# Patient Record
Sex: MALE | Race: WHITE
[De-identification: names, ages, dates, MRNs, and addresses within clinical notes are randomized per-mention and may not be internally consistent; named-entity substitution may affect disease eponyms.]

---

## 2019-01-08 ENCOUNTER — HOSPITAL ENCOUNTER (OUTPATIENT)
Dept: HOSPITAL 84 - D.CATH | Age: 59
Discharge: HOME | End: 2019-01-08
Attending: INTERNAL MEDICINE
Payer: COMMERCIAL

## 2019-01-08 VITALS
HEIGHT: 65 IN | BODY MASS INDEX: 33.89 KG/M2 | BODY MASS INDEX: 33.89 KG/M2 | WEIGHT: 203.43 LBS | WEIGHT: 203.43 LBS | HEIGHT: 65 IN

## 2019-01-08 VITALS — SYSTOLIC BLOOD PRESSURE: 135 MMHG | DIASTOLIC BLOOD PRESSURE: 82 MMHG

## 2019-01-08 DIAGNOSIS — I35.0: ICD-10-CM

## 2019-01-08 DIAGNOSIS — R94.30: ICD-10-CM

## 2019-01-08 DIAGNOSIS — I25.110: Primary | ICD-10-CM

## 2019-01-08 LAB
ANION GAP SERPL CALC-SCNC: 14.9 MMOL/L (ref 8–16)
BASOPHILS NFR BLD AUTO: 0.5 % (ref 0–2)
BUN SERPL-MCNC: 17 MG/DL (ref 7–18)
CALCIUM SERPL-MCNC: 8.9 MG/DL (ref 8.5–10.1)
CHLORIDE SERPL-SCNC: 103 MMOL/L (ref 98–107)
CO2 SERPL-SCNC: 25 MMOL/L (ref 21–32)
CREAT SERPL-MCNC: 1 MG/DL (ref 0.6–1.3)
EOSINOPHIL NFR BLD: 12.7 % (ref 0–7)
ERYTHROCYTE [DISTWIDTH] IN BLOOD BY AUTOMATED COUNT: 12.8 % (ref 11.5–14.5)
GLUCOSE SERPL-MCNC: 114 MG/DL (ref 74–106)
HCT VFR BLD CALC: 44.5 % (ref 42–54)
HGB BLD-MCNC: 15.2 G/DL (ref 13.5–17.5)
IMM GRANULOCYTES NFR BLD: 0.2 % (ref 0–5)
LYMPHOCYTES NFR BLD AUTO: 40.4 % (ref 15–50)
MCH RBC QN AUTO: 30.3 PG (ref 26–34)
MCHC RBC AUTO-ENTMCNC: 34.2 G/DL (ref 31–37)
MCV RBC: 88.8 FL (ref 80–100)
MONOCYTES NFR BLD: 11.5 % (ref 2–11)
NEUTROPHILS NFR BLD AUTO: 34.7 % (ref 40–80)
OSMOLALITY SERPL CALC.SUM OF ELEC: 280 MOSM/KG (ref 275–300)
PLATELET # BLD: 251 10X3/UL (ref 130–400)
PMV BLD AUTO: 10.5 FL (ref 7.4–10.4)
POTASSIUM SERPL-SCNC: 3.9 MMOL/L (ref 3.5–5.1)
RBC # BLD AUTO: 5.01 10X6/UL (ref 4.2–6.1)
SODIUM SERPL-SCNC: 139 MMOL/L (ref 136–145)
WBC # BLD AUTO: 8.3 10X3/UL (ref 4.8–10.8)

## 2019-01-08 NOTE — NUR
REPOSITIONED TO SITTING WITH HOB UP 30 FOR COMFORT.4 CC AIR REMOVED
FROM TR BAND WITH NO BLEEDING NOTED. 5 FR EXOSEAL R/GROIN REMAINS CDI

## 2019-01-08 NOTE — NUR
4 CC AIR REMOVED FROM TR BAND WITH NO BLEEDING. PIV REMOVED WITH
DRESSING APPLIED. PATIENT DENIED CHEST PAIN AS GETTING UP TO GET
DRESSED FOR DISCHARGE HOME

## 2019-01-08 NOTE — HEMODYNAMI
PATIENT:BRENNON KELLY                                     MEDICAL RECORD: [id number]
: 58                                            LOCATION:D.CAT          
ACCT# [id number]                                       ADMISSION DATE: 19
 
 
 Generatedon:20198:33
Patient name: BRENNON KELLY Patient #: [id number] Visit #: [id number] SSN: :
 1960 Date of
study: 2019
Page: Of
Hemodynamic Procedure Report
****************************
Patient Data
Patient Demographics
Procedure consent was obtained
First Name: BRENNON            Gender: Male
Last Name: LETICIA          : 1960
Patient #: [id number]       Age: 58 year(s)
Visit #: [id number]       Race: Unknown
Accession #:
[id number]
Additional ID: [id number]
Contact details
Address: [address] [place] [address]      Phone: [phone]
[facility]
State: [place]
City: [place]
Zip code: [address]
Past Medical History
Allergies: No known allergies
Admission
Admission Data
Admission Date: 2019    Admission Time: 5:59
Procedure
Procedure Types
Cath Procedure
Diagnostic Procedure
LHC
Coronaries only
Sedation Charges
Moderate Sedation up to 15 minutes
Procedure Description
Procedure Date
Procedure Date: 2019
Procedure Start Time: 8:12
Procedure End Time: 8:33
Procedure Staff
Name                            Function
Macario Huff MD              Performing Physician
Deisi Toribio RT             Monitor
Genie Escalante RN                Nurse
Leighann Petty RN                  Circulator
Jeanne Brandt RT               Scrub
Procedure Data
Cath Procedure
Fluoroscopy
Diagnostic fluoroscopy      Total fluoroscopy Time: 6.9
time: 6.9 min               min
Diagnostic fluoroscopy      Total fluoroscopy dose: 691
 
dose: 691 mGy               mGy
Contrast Material
Contrast Material Type                       Amount (ml)
Isovue 300                                   43
Entry Location
Entry     Primary  Successful  Side  Size  Upsize Upsize Entry    Closure     Canales
ccessful  Closure
Location                             (Fr)  1 (Fr) 2 (Fr) Remarks  Device        
          Remarks
Radial                         Right 6 Fr                         Mechanical
artery                               Short                        Compression
Femoral                        Right 5 Fr                         Exoseal
artery
Estimated blood loss: 5 ml
Diagnostic catheters
Device Type               Used For           End Catheter
Placement
DIAGNOSTIC [place] 110cm 5  LV Angiography
Fr catheter (430758)
MULTIPACK JL 4.0 5Fr      Left Coronary
catheter                  Angiography
MULTIPACK 3DRC 5Fr        Right Coronary
catheter                  Angiography
MULTIPACK Pigtail 5 Fr    LV Angiography
catheter
DIAGNOSTIC AL1 5Fr        LV Angiography
catheter ([id number])
Procedure Complications
No complications
Procedure Medications
Medication           Administration Route Dosage
0.9% NaCl            I.V.                 100 ml/hr
Oxygen               etCO2 Nasal cannula  2 l/min
Lidocaine 2%         added to field       20
Heparin Flush Bag    added to field       2 bags
(1000units/500ml NS)
Radial Cocktail      added to field       1 syringe
(Verapomil 2mg/Nitro
400mcg/Heparin
1500units)
Versed               I.V.                 2 mg
Fentanyl             I.V.                 50 mcg
Versed               I.V.                 1 mg
Fentanyl             I.V.                 25 mcg
Versed               I.V.                 1 mg
Fentanyl             I.V.                 25 mcg
Hemodynamics
Rest
Heart Rate: 75 (bpm)
Snapshots
Pre Cath      Intra         NCS           Post Cath
Vital Signs
Time    Heart  Resp   SPO2 etCO2   NIBP (mmHg) Rhythm  Pain    Sedation
Rate   (ipm)  (%)  (mmHg)                      Status  Level
(bpm)
7:43:02 82     16     98   21.8    144/90(127) NSR     0 (11)  10(A)
, No
pain
7:47:25 80     17     98   36.8    125/80(99)  NSR     0 (11)  10(A)
, No
 
pain
7:52:34 72     17     97   37.6    101/81(92)  NSR     0 (11)  10(A)
, No
pain
7:56:42 78     15     97   39.1    105/71(89)  NSR     0 (11)  10(A)
, No
pain
8:00:50 79     15     98   42.1    109/74(85)  NSR     0 (11)  10(A)
, No
pain
8:06:01 70     15     98   34.6    102/65(85)  NSR     0 (11)  10(A)
, No
pain
8:10:13 72     15     96   28.6    104/61(82)  NSR     0 (11)  9(A)
, No
pain
8:14:23 76     16     97   35.3    105/66(83)  NSR     0 (11)  9(A)
, No
pain
8:18:31 80     15     96   25.6    98/63(83)   NSR     0 (11)  10(A)
, No
pain
8:22:41 76     12     97   24.1    109/65(91)  NSR     0 (11)  9(A)
, No
pain
8:26:48 79     12     98   25.8    108/76(92)  NSR     0 (11)  9(A)
, No
pain
8:30:54 73     11     96   37.6    104/70(90)  NSR     0 (11)  10(A)
, No
pain
Medications
Time    Medication       Route   Dose    Verified Delivered Reason     Notes  Ef
fectiveness
by       by
7:40:26 0.9% NaCl        I.V.    100     Macario  Genie     used for
ml/hr   Daria  Boris   procedure
MD STROUD
7:40:33 Oxygen           etCO2   2 l/min Macario  Genie     used for
Nasal           [facility]   procedure
cannula         MD       RN
7:40:39 Lidocaine 2%     added   20ml    Macario Sorto   for local
to      vial    [facility]   anesthetic
field MD AMOS
7:40:47 Heparin Flush    added   2 bags  Macario Sorto   used for
Bag              to              [facility]   procedure
(1000units/500ml field           MD       MD
NS)
7:44:30 Radial Cocktail  added   1       Macario  Macario   used for
(Verapomil       to      syringe [facility]   procedure
2mg/Nitro        field           MD       MD
400mcg/Heparin
1500units)
8:03:59 Versed           I.V.    2 mg    Macario  Genie     for
DariaNakul Escalante   sedation
MD STROUD
8:04:06 Fentanyl         I.V.    50 mcg  Macario  Genie     for
St Nakul Escalante   sedation
MD STROUD
8:08:46 Versed           I.V.    1 mg    Macario  Genie     for
 
St Nakul Escalante   sedation
MD STROUD
8:08:49 Fentanyl         I.V.    25 mcg  Macario  Genie     for
St Nakul Escalante   sedation
MD STROUD
8:17:47 Versed           I.V.    1 mg    Macario  Genie     for
St Nakul Escalante   sedation
MD STROUD
8:18:26 Fentanyl         I.V.    25 mcg  Macario  Genie     for
St Nakul Escalante   sedation
MD STROUD
Procedure Log
Time     Note
7:27:18  Time tracking: Regular hours (M-F 7:00 - 5:00)
7:27:22  Plan of Care:Hemodynamics will remain stable., Cardiac
rhythm will remain stable., Comfort level will be
maintained., Respiratory function will remain
adequate., Patient/ family verbilizes understanding of
procedure., Procedure tolerated without complication.,
Recovers from procedure without complications..
7:27:25  Leighann Petty RN sent for patient. Start room use.
7:36:31  Patient received from Pre/Post Procedure Room to CCL 1
Alert and oriented. Tansferred to table in Supine
position.
7:36:32  Warm blankets applied, and lynn hugger turned on for
patient comfort.
7:36:33  Correct patient and procedure confirmed by team.
7:36:34  Signed procedure consent form obtained from patient.
7:36:37  ECG and BP/O2 sat monitors applied to patient.
7:36:37  Full Disclosure recording started
7:40:26  0.9% NaCl 100 ml/hr I.V. was administered by Genie Escalante RN; used for procedure;
7:40:33  Oxygen 2 l/min etCO2 Nasal cannula was administered by
Genie Escalante RN; used for procedure;
7:40:39  Lidocaine 2% 20ml vial added to field was administered
by Macario Huff MD; for local anesthetic;
7:40:47  Heparin Flush Bag (1000units/500ml NS) 2 bags added to
field was administered by Macario Huff MD; used for
procedure;
7:41:46  Vital chart was started
7:41:50  Rhythm: sinus rhythm
7:42:02  H&P Date Dictated: 2019 Within 30 days and on
chart., H&P Addendum completed by physician on day of
procedure. (MUST COMPLETE FOR ALL OUTPATIENTS).
7:44:12  Pre-procedure instructions explained to patient.
7:44:13  Pre-op teaching completed and patient verbalized
understanding.
7:44:18  Family in patients room.
7:44:19  Patient NPO since Midnight.
7:44:26  Patient allergic to No known allergies
7:44:28  Is the patient allergic to Iodine/contrast media? No.
7:44:30  Radial Cocktail (Verapomil 2mg/Nitro 400mcg/Heparin
1500units) 1 syringe added to field was administered
by Macario Huff MD; used for procedure;
7:44:30  Is patient on blood thinner?No
7:44:31  Patient diabetic? No.
7:44:35  Previous problem with sedation/anesthesia? No ?
7:44:36  Snore? Yes
7:44:37  Sleep apnea? No
7:44:39  Deviated septum? No
 
7:44:40  Opens mouth fully? Yes
7:44:41  Sticks out tongue? Yes
7:44:44  Airway obstruction? No ?
7:44:55  Dentures? No LOST TEETH
7:45:04  Pre procedure: right dorsailis pedis pulse 2+ Normal;
easily identifiable; not easily obliterated
7:45:07  Modified Roman's test Ulnar < 7 seconds
7:45:09  Patient pain scale 0/10 ?.
7:45:16  IV patent on arrival in left forearm with 0.9% NaCl at
[facility].
7:45:19  Lab results completed and on chart.
7:45:28  Right Radial & Right Groin area was prepped with
chlora-prep and draped in sterile fashion
7:45:34  Alarms reviewed by R. N.
7:45:35  Sharps counted by scrub and verified by R.N.
7:45:38  Use device set Radial Dx or PCI
7:45:39  ACIST Syringe (53263) opened to sterile field.
7:45:40  Medline Cath Pack ([id number]) opened to sterile field.
7:45:40  Bag Decanter (2002S) opened to sterile field.
7:45:41  DIAGNOSTIC WIRE .035 260cm J wire (014934) opened to
sterile field.
7:45:41  ACIST Hand Control (50040) opened to sterile field.
7:45:42  ACIST Manifold (64198) opened to sterile field.
7:45:46  MBrace Wrist Support ([id number]) opened to sterile
field.
7:45:47  SHEATH 6FR Slender () opened to sterile field.
7:48:39  Baseline sample Acquired.
7:52:24  Physician paged
7:53:25  Zero performed for pressure channel P1
8:03:26  Final Timeout: patient, procedure, and site verified
with staff and physician. All members of the team are
in agreement.
8:03:29  Right Radial site verified by team.
8:03:33  Physical assessment completed. ASA score P 2 - A
patient with mild systemic disease as per Macario Huff MD.
8:03:36  Sedation plan: IV Moderate Sedation Medication:Versed,
Fentanyl
8:03:59  Versed 2 mg I.V. was administered by Genie Escalante RN;
for sedation;
8:04:06  Fentanyl 50 mcg I.V. was administered by Genie Escalante
RN; for sedation;
8:08:46  Versed 1 mg I.V. was administered by Genie Escalante RN;
for sedation;
8:08:49  Fentanyl 25 mcg I.V. was administered by Genie Escalante RN; for sedation;
8:12:19  Procedure started.
8:12:28  Local anesthetic to right radial artery with Lidocaine
2% by Macario Huff MD.**INITIAL ACCESS ONLY**
8:13:26  A 6 Fr Short sheath was inserted into the Right Radial
artery
8:13:39  A DIAGNOSTIC [place] 110cm 5 Fr catheter ([id number]) was
advanced over the wire and used for LV Angiography.
REMOVED UNABLE TO ADVANCE
8:15:28  GLIDE WIRE Super Stiff Angled 260cm ([id number]) opened to
sterile field.
8:16:02  TORQUE DEVICE PLASTIC .038 ( TD01) opened to sterile
field.
8:16:24  SUPER STIFF wire advanced.
8:17:29  Wire removed.
 
8:17:34  Local anesthetic to right femoral artery with
Lidocaine 2% by Macario Huff MD.**ADDITIONAL
ACCESS**
8:17:47  Versed 1 mg I.V. was administered by Genie Escalante RN;
for sedation;
8:18:10  Use device set Multipack Set
8:18:14  DIAGNOSTIC Multipack 5Fr catheter set ([id number]) opened
to sterile field.
8:18:26  Fentanyl 25 mcg I.V. was administered by Genie Escalante
RN; for sedation;
8:18:31  A 5 Fr sheath was inserted into the Right Femoral
artery
8:19:04  A MULTIPACK JL 4.0 5Fr catheter was advanced over the
wire and used for Left Coronary Angiography.
8:20:33  SHEATH 5FR [place] (NDC157) opened to sterile field.
8:21:07  Catheter removed.
8:21:28  A MULTIPACK 3DRC 5Fr catheter was advanced over the
wire and used for Right Coronary Angiography.
8:21:57  Catheter removed.
8:23:38  A MULTIPACK Pigtail 5 Fr catheter was advanced over
the wire and used for LV Angiography. REMOVED UNABLE
TO CROSS VALVE
8:25:27  A DIAGNOSTIC AL1 5Fr catheter ([id number]) was advanced
over the wire and used for LV Angiography.
8:26:04  ROADRUNNER .035 260 glide wire ([id number]) opened to
sterile field.
8:26:56  ROADRUNNER wire advanced.
8::57  Timer 1 started at 8:26 AM, stopped at 8:28 AM,
duration 00:02:36 sec.
8:29:00  Catheter removed.
8:29:05  Wire removed.
8:29:19  Sheath removed intact; hemostasis achieved with
Exoseal to the Right Femoral artery.
8::32  Sheath removed intact; hemostasis achieved with
Mechanical Compression to the Right Radial artery.
8:29:34  Procedure ended.(Physican Out)
8:29:44  Fluoroscopy time 06.90 minutes.
8:29:50  Fluoroscopy dose: 691 mGy
8:29:50  Flurop Dose total: 691
8:30:07  Contrast amount:Isovue 300 43ml.
8:30:09  Sharps counted by scrub and verified by R.N.
8:30:11  TR band inflated with 12cc of air.
8:30:16  Insertion/operative site no bleeding no hematoma.
8:30:19  Post-op/insertion site Right Femoral artery dressed
using a 4 x 4 and Tegaderm.
8:30:26  Post right femoral artery:stable, clean and dry
8:30:58  Post right radial artery:stable, clean and dry
8:31:00  Post Procedure Pulses reassessed and unchanged
8:31:03  Post-procedure physical assessment completed. ASA
score P 2 - A patient with mild systemic disease as
per Macario Huff MD.
8:31:06  Post procedure rhythm: unchanged.
8:31:08  Estimated blood loss: 5 ml
8:31:09  Post procedure instruction explained to
patient.Patient verbalizes understanding.
8:31:09  Patient needs reinforcement of post procedure
teaching.
8:31:18  Procedure type changed to Cath procedure, Diagnostic
procedure, LHC, Coronaries only, Sedation Charges,
Moderate Sedation up to 15 minutes
 
8::34  Procedure Complication : No complications
8:31:36  See physician's report for complete and final results.
8:32:08  TR BAND Standard ([id number]) opened to sterile field.
8:32:18  EXOSEAL 5Fr () opened to sterile field.
8:32:19  Tegaderm 4 x 4 (1626W) opened to sterile field.
8:32:38  Procedure and supply charges have been captured,
reviewed, submitted and are correct.
8:32:47  Vital chart was stopped
8:32:49  Report given to Pre/Post Procedure Room.
8:32:51  Patient transfered to Pre/Post Procedure Room with
Stretcher.
8:33:00  Procedure ended.
8:33:00  Full Disclosure recording stopped
8:33:03  End room use (Document Last)
Device Usage
Item Name   Manufacture  Quantity  Catalog      Hospital Part    Current Minimal
 Lot# /
Number       Charge   Number  Stock   Stock   Serial#
Code
ACIST       Acist        1         48337        888531   827699  059795  20
Syringe     Medical
(20169)     Systems Inc
Medline     Medline      1         [id number]    951145   78573   847638  5
Cath Pack
([id number])
Bag         Microtek     1         S        138129   07598   855503  5
Decanter    Medical Inc.
()
DIAGNOSTIC  [place]      1         615694       989788   252870  787328  30
WIRE .035
260cm J
wire
(326935)
ACIST Hand  Acist        1         37403        423369   781608  333998  5
Control     Medical
(20545)     Systems Inc
ACIST       Acist        1         79426        203839   662474  927506  5
Manifold    Medical
(59763)     Systems Inc
MBrace      Advanced     1         140-0250-00  708145   63736   009911  5
Wrist       Vascular
Support     Dynamics
([id number])
SHEATH 6FR  Terumo       1         [id number]   932889   116184  380281  5
Slender
()
DIAGNOSTIC  Terumo       1               279096   980588  305942  5
[place]iger 110cm
5 Fr
catheter
(359962)
GLIDE WIRE  Terumo       1         [id number]       754349   080187  716258  5
Super Stiff
Angled
260cm
(AE5705)
TORQUE      Ferdinand       1         TD01         911037   207124  416470  5
DEVICE      Scientific
PLASTIC
.038 (
 
TD01)
DIAGNOSTIC  Cardinal     1         RB7584       141449   90371   719943  30
Multipack   Health
5Fr
catheter
set
(WL5802)
MULTIPACK   Cardinal     1                                       121196  5
JL 4.0 5Fr  Health
catheter
SHEATH 5FR  Terumo       1         [id number]       083092   933402  907779  5
[place]
(LSM734)
MULTIPACK   Cardinal     1                                       282576  5
3DRC 5Fr    Health
catheter
MULTIPACK   Cardinal     1                                       779202  5
Pigtail 5   Health
Fr catheter
DIAGNOSTIC  Cardinal     1         [id number]      055515   379642  179702  15
AL1 5Fr     Health
catheter
(569477T)
[facility] 1         [id number]       186201   136186  981442  5
.035 260
glide wire
(C59407)
TR BAND     Terumo       1         SSL29-RCU    047340   221945  840417  40
Standard
([id number])
EXOSEAL 5Fr Cardinal     1                 606861   818520  991429  10
()     Health
Tegaderm 4  3M           1         1626W        243576   633934  235165  5
x 4 (1626W)
Signature Audit [employer]
Stage           Time        Signature      Unsigned
Intra-Procedure 2019    Deisi
8:33:36 AM  Counts RT(R)
Signatures
Monitor : Deisi     Signature :
Counts RT               ______________________________
Date : ______________ Time :
______________
 
 
 
 
 
 
 
 
 
 
 
 
[address] [place] [address]

## 2019-01-08 NOTE — NUR
PATIENT TOLERATING SIPS OF WATER WITH NAUSEA DENIED. TR BAND IS CDI
AND 5 FR EXOSEAL TO R/GROIN REMAINS CDI NO BLEEDING NOTED CALL LIGHT
IN REACH WITH FAMILY AT SIDE

## 2019-01-08 NOTE — NUR
DR CRAIG PRESENT AT BEDSIDE TALKING TO PATIENT AND FAMILY. VSS WITH
NO DISTRESS. TR BAND IS TO R/WRIST CDI AND 5 FR EXOSEAL R/GROIN IS
CDI

## 2019-01-08 NOTE — NUR
VERBAL AND WRITTEN DISCHARGE GONE OVER WITH PATIENT AND FAMILY. TR
BAND REMOVED WITH DRESSING APPLIED. PATIENT LEFT VIA WC TO PARKING
FOR TRANSPORT HOME NO DISTRESS

## 2019-01-08 NOTE — NUR
RECIEVED TO ROOM VIA STRETCHER FROM CATH LAB WITH TR BAND TO R/WRIST
CDI AND 5 FR EXOSEAL R/GROIN CDI NO BLEEDING OR HEMATOMA NOTED.
PATIENT CONNECTED TO MONITOR FOR OBSERVATION WITH HR 72 /62
CHEST PAIN IS DENIED. INSTRUCTED PATIENT TO KEEP HEAD FLAT ON PILLOW
WITH RLE STRAIGHT

## 2019-01-10 NOTE — OP
PATIENT NAME:  BRENNON KELLY                              MEDICAL RECORD: [id number]
:58                                             LOCATION:D.CAT          
                                                         ADMISSION DATE:        
SURGEON:  VIOLA CRAIG MD          
 
 
DATE OF OPERATION:  2019
 
PROCEDURES:  Left heart catheterization, selective coronary angiography, right
femoral artery approach.
 
CATHETERS:  A 5-Vietnamese sheath, 5/4 left and right Melissa.  
 
Attempt to cross the aortic valve with a pigtail as well as [place], unable to cross
the aortic valve.  
Previous gradient of 44 mmHg.  LV function normal on echocardiographic study.
 
CORONARY ANATOMY:
LEFT MAIN:  Left main is free of disease.
LAD:  Has an 80% stenosis before takeoff of a large diagonal, both appear to be
reasonable targets.
CIRCUMFLEX:  Left dominant system with a proximal stenosis of 80%.
RIGHT CORONARY ARTERY:  Rudimentary, but does have significant proximal
stenosis.
 
IMPRESSION:  Multivessel disease, at least moderate AS.  Probably best, given
young age, etc, served by bypass graft as well as AVR.
 
TRANSINT:[id number] Voice Confirmation ID: [id number] DOCUMENT ID: [id number]
                                           
                                           VIOLA CRAIG MD          
 
 
 
Electronically Signed by VIOLA CRAIG on 01/10/19 at 1330
 
 
 
 
 
 
 
 
 
 
 
 
 
 
 
CC:                                                             [id number]
DICTATION DATE: 19 0836     :     19 1112      DEP CLI 
                                                                      19
[facility]                                          
[address] [place] [address]

## 2019-01-14 LAB
ALBUMIN SERPL-MCNC: 3.8 G/DL (ref 3.4–5)
ALP SERPL-CCNC: 57 U/L (ref 46–116)
ALT SERPL-CCNC: 50 U/L (ref 10–68)
ANION GAP SERPL CALC-SCNC: 13.5 MMOL/L (ref 8–16)
APPEARANCE UR: CLEAR
APTT BLD: 26.5 SECONDS (ref 22.8–39.4)
BASOPHILS NFR BLD AUTO: 0.5 % (ref 0–2)
BILIRUB SERPL-MCNC: 0.4 MG/DL (ref 0.2–1.3)
BILIRUB SERPL-MCNC: NEGATIVE MG/DL
BUN SERPL-MCNC: 15 MG/DL (ref 7–18)
CALCIUM SERPL-MCNC: 9 MG/DL (ref 8.5–10.1)
CHLORIDE SERPL-SCNC: 100 MMOL/L (ref 98–107)
CO2 SERPL-SCNC: 26.9 MMOL/L (ref 21–32)
COLOR UR: YELLOW
CREAT SERPL-MCNC: 1 MG/DL (ref 0.6–1.3)
EOSINOPHIL NFR BLD: 6.9 % (ref 0–7)
ERYTHROCYTE [DISTWIDTH] IN BLOOD BY AUTOMATED COUNT: 12.3 % (ref 11.5–14.5)
EST. AVERAGE GLUCOSE BLD GHB EST-MCNC: 120 MG/DL (ref 74–154)
GLOBULIN SER-MCNC: 4.3 G/L
GLUCOSE SERPL-MCNC: 50 MG/DL
GLUCOSE SERPL-MCNC: 72 MG/DL (ref 74–106)
HCT VFR BLD CALC: 44.6 % (ref 42–54)
HGB BLD-MCNC: 15.3 G/DL (ref 13.5–17.5)
IMM GRANULOCYTES NFR BLD: 0.2 % (ref 0–5)
INR PPP: 1.05 (ref 0.85–1.17)
KETONES UR STRIP-MCNC: NEGATIVE MG/DL
LYMPHOCYTES NFR BLD AUTO: 40.2 % (ref 15–50)
MCH RBC QN AUTO: 30.6 PG (ref 26–34)
MCHC RBC AUTO-ENTMCNC: 34.3 G/DL (ref 31–37)
MCV RBC: 89.2 FL (ref 80–100)
MONOCYTES NFR BLD: 12.7 % (ref 2–11)
MUCOUS THREADS #/AREA URNS LPF: (no result) /LPF
NEUTROPHILS NFR BLD AUTO: 39.5 % (ref 40–80)
NITRITE UR-MCNC: NEGATIVE MG/ML
OSMOLALITY SERPL CALC.SUM OF ELEC: 273 MOSM/KG (ref 275–300)
PH UR STRIP: 5 [PH] (ref 5–6)
PHOSPHATE SERPL-MCNC: 3.8 MG/DL (ref 2.5–4.9)
PLATELET # BLD: 269 10X3/UL (ref 130–400)
PMV BLD AUTO: 10.3 FL (ref 7.4–10.4)
POTASSIUM SERPL-SCNC: 3.4 MMOL/L (ref 3.5–5.1)
PROT SERPL-MCNC: 8.1 G/DL (ref 6.4–8.2)
PROT UR-MCNC: NEGATIVE MG/DL
PROTHROMBIN TIME: 13.2 SECONDS (ref 11.6–15)
RBC # BLD AUTO: 5 10X6/UL (ref 4.2–6.1)
RBC #/AREA URNS HPF: (no result) /HPF (ref 0–5)
SODIUM SERPL-SCNC: 137 MMOL/L (ref 136–145)
SP GR UR STRIP: 1.01 (ref 1–1.02)
T4 FREE SERPL-MCNC: 0.8 NG/DL (ref 0.76–1.46)
TSH SERPL-ACNC: 1.25 UIU/ML (ref 0.36–3.74)
URATE SERPL-MCNC: 5.6 MG/DL (ref 2.6–7.2)
UROBILINOGEN UR-MCNC: NORMAL MG/DL
WBC # BLD AUTO: 10.8 10X3/UL (ref 4.8–10.8)
WBC #/AREA URNS HPF: (no result) /HPF (ref 0–5)

## 2019-01-15 LAB — HCV AB S/CO SERPL IA: 0.1 S/CO RAT (ref 0–0.9)

## 2019-01-17 ENCOUNTER — HOSPITAL ENCOUNTER (INPATIENT)
Dept: HOSPITAL 84 - D.SDCHOLD | Age: 59
LOS: 6 days | Discharge: HOME | DRG: 220 | End: 2019-01-23
Attending: THORACIC SURGERY (CARDIOTHORACIC VASCULAR SURGERY) | Admitting: THORACIC SURGERY (CARDIOTHORACIC VASCULAR SURGERY)
Payer: COMMERCIAL

## 2019-01-17 VITALS — SYSTOLIC BLOOD PRESSURE: 111 MMHG | DIASTOLIC BLOOD PRESSURE: 68 MMHG

## 2019-01-17 VITALS — DIASTOLIC BLOOD PRESSURE: 63 MMHG | SYSTOLIC BLOOD PRESSURE: 102 MMHG

## 2019-01-17 VITALS — SYSTOLIC BLOOD PRESSURE: 95 MMHG | DIASTOLIC BLOOD PRESSURE: 63 MMHG

## 2019-01-17 VITALS — DIASTOLIC BLOOD PRESSURE: 63 MMHG | SYSTOLIC BLOOD PRESSURE: 105 MMHG

## 2019-01-17 VITALS — DIASTOLIC BLOOD PRESSURE: 75 MMHG | SYSTOLIC BLOOD PRESSURE: 123 MMHG

## 2019-01-17 VITALS
HEIGHT: 65 IN | BODY MASS INDEX: 34.96 KG/M2 | WEIGHT: 209.81 LBS | BODY MASS INDEX: 34.96 KG/M2 | BODY MASS INDEX: 34.96 KG/M2 | BODY MASS INDEX: 34.96 KG/M2 | HEIGHT: 65 IN | WEIGHT: 209.81 LBS

## 2019-01-17 VITALS — SYSTOLIC BLOOD PRESSURE: 114 MMHG | DIASTOLIC BLOOD PRESSURE: 66 MMHG

## 2019-01-17 VITALS — DIASTOLIC BLOOD PRESSURE: 66 MMHG | SYSTOLIC BLOOD PRESSURE: 111 MMHG

## 2019-01-17 VITALS — SYSTOLIC BLOOD PRESSURE: 101 MMHG | DIASTOLIC BLOOD PRESSURE: 63 MMHG

## 2019-01-17 VITALS — SYSTOLIC BLOOD PRESSURE: 98 MMHG | DIASTOLIC BLOOD PRESSURE: 63 MMHG

## 2019-01-17 VITALS — DIASTOLIC BLOOD PRESSURE: 61 MMHG | SYSTOLIC BLOOD PRESSURE: 94 MMHG

## 2019-01-17 VITALS — DIASTOLIC BLOOD PRESSURE: 65 MMHG | SYSTOLIC BLOOD PRESSURE: 103 MMHG

## 2019-01-17 VITALS — SYSTOLIC BLOOD PRESSURE: 108 MMHG | DIASTOLIC BLOOD PRESSURE: 65 MMHG

## 2019-01-17 VITALS — DIASTOLIC BLOOD PRESSURE: 67 MMHG | SYSTOLIC BLOOD PRESSURE: 109 MMHG

## 2019-01-17 VITALS — SYSTOLIC BLOOD PRESSURE: 110 MMHG | DIASTOLIC BLOOD PRESSURE: 66 MMHG

## 2019-01-17 VITALS — DIASTOLIC BLOOD PRESSURE: 62 MMHG | SYSTOLIC BLOOD PRESSURE: 99 MMHG

## 2019-01-17 VITALS — SYSTOLIC BLOOD PRESSURE: 96 MMHG | DIASTOLIC BLOOD PRESSURE: 62 MMHG

## 2019-01-17 VITALS — DIASTOLIC BLOOD PRESSURE: 67 MMHG | SYSTOLIC BLOOD PRESSURE: 107 MMHG

## 2019-01-17 VITALS — DIASTOLIC BLOOD PRESSURE: 78 MMHG | SYSTOLIC BLOOD PRESSURE: 123 MMHG

## 2019-01-17 VITALS — SYSTOLIC BLOOD PRESSURE: 109 MMHG | DIASTOLIC BLOOD PRESSURE: 69 MMHG

## 2019-01-17 VITALS — DIASTOLIC BLOOD PRESSURE: 68 MMHG | SYSTOLIC BLOOD PRESSURE: 123 MMHG

## 2019-01-17 VITALS — SYSTOLIC BLOOD PRESSURE: 104 MMHG | DIASTOLIC BLOOD PRESSURE: 66 MMHG

## 2019-01-17 VITALS — SYSTOLIC BLOOD PRESSURE: 104 MMHG | DIASTOLIC BLOOD PRESSURE: 65 MMHG

## 2019-01-17 VITALS — DIASTOLIC BLOOD PRESSURE: 68 MMHG | SYSTOLIC BLOOD PRESSURE: 109 MMHG

## 2019-01-17 VITALS — DIASTOLIC BLOOD PRESSURE: 61 MMHG | SYSTOLIC BLOOD PRESSURE: 101 MMHG

## 2019-01-17 VITALS — DIASTOLIC BLOOD PRESSURE: 63 MMHG | SYSTOLIC BLOOD PRESSURE: 107 MMHG

## 2019-01-17 VITALS — DIASTOLIC BLOOD PRESSURE: 57 MMHG | SYSTOLIC BLOOD PRESSURE: 93 MMHG

## 2019-01-17 VITALS — SYSTOLIC BLOOD PRESSURE: 121 MMHG | DIASTOLIC BLOOD PRESSURE: 73 MMHG

## 2019-01-17 VITALS — DIASTOLIC BLOOD PRESSURE: 79 MMHG | SYSTOLIC BLOOD PRESSURE: 142 MMHG

## 2019-01-17 VITALS — SYSTOLIC BLOOD PRESSURE: 123 MMHG | DIASTOLIC BLOOD PRESSURE: 75 MMHG

## 2019-01-17 VITALS — DIASTOLIC BLOOD PRESSURE: 61 MMHG | SYSTOLIC BLOOD PRESSURE: 99 MMHG

## 2019-01-17 DIAGNOSIS — J90: ICD-10-CM

## 2019-01-17 DIAGNOSIS — D62: ICD-10-CM

## 2019-01-17 DIAGNOSIS — I48.91: ICD-10-CM

## 2019-01-17 DIAGNOSIS — I25.10: Primary | ICD-10-CM

## 2019-01-17 DIAGNOSIS — R00.0: ICD-10-CM

## 2019-01-17 DIAGNOSIS — K21.9: ICD-10-CM

## 2019-01-17 DIAGNOSIS — I35.0: ICD-10-CM

## 2019-01-17 DIAGNOSIS — I95.9: ICD-10-CM

## 2019-01-17 PROCEDURE — B24BZZ4 ULTRASONOGRAPHY OF HEART WITH AORTA, TRANSESOPHAGEAL: ICD-10-PCS | Performed by: THORACIC SURGERY (CARDIOTHORACIC VASCULAR SURGERY)

## 2019-01-17 PROCEDURE — 02RF08Z REPLACEMENT OF AORTIC VALVE WITH ZOOPLASTIC TISSUE, OPEN APPROACH: ICD-10-PCS | Performed by: THORACIC SURGERY (CARDIOTHORACIC VASCULAR SURGERY)

## 2019-01-17 PROCEDURE — 06BP0ZZ EXCISION OF RIGHT SAPHENOUS VEIN, OPEN APPROACH: ICD-10-PCS | Performed by: THORACIC SURGERY (CARDIOTHORACIC VASCULAR SURGERY)

## 2019-01-17 PROCEDURE — 5A1221Z PERFORMANCE OF CARDIAC OUTPUT, CONTINUOUS: ICD-10-PCS | Performed by: THORACIC SURGERY (CARDIOTHORACIC VASCULAR SURGERY)

## 2019-01-17 PROCEDURE — 02100Z9 BYPASS CORONARY ARTERY, ONE ARTERY FROM LEFT INTERNAL MAMMARY, OPEN APPROACH: ICD-10-PCS | Performed by: THORACIC SURGERY (CARDIOTHORACIC VASCULAR SURGERY)

## 2019-01-17 PROCEDURE — 021109W BYPASS CORONARY ARTERY, TWO ARTERIES FROM AORTA WITH AUTOLOGOUS VENOUS TISSUE, OPEN APPROACH: ICD-10-PCS | Performed by: THORACIC SURGERY (CARDIOTHORACIC VASCULAR SURGERY)

## 2019-01-18 VITALS — SYSTOLIC BLOOD PRESSURE: 112 MMHG | DIASTOLIC BLOOD PRESSURE: 64 MMHG

## 2019-01-18 VITALS — SYSTOLIC BLOOD PRESSURE: 96 MMHG | DIASTOLIC BLOOD PRESSURE: 57 MMHG

## 2019-01-18 VITALS — SYSTOLIC BLOOD PRESSURE: 107 MMHG | DIASTOLIC BLOOD PRESSURE: 54 MMHG

## 2019-01-18 VITALS — DIASTOLIC BLOOD PRESSURE: 59 MMHG | SYSTOLIC BLOOD PRESSURE: 113 MMHG

## 2019-01-18 VITALS — SYSTOLIC BLOOD PRESSURE: 100 MMHG | DIASTOLIC BLOOD PRESSURE: 60 MMHG

## 2019-01-18 VITALS — DIASTOLIC BLOOD PRESSURE: 58 MMHG | SYSTOLIC BLOOD PRESSURE: 101 MMHG

## 2019-01-18 VITALS — SYSTOLIC BLOOD PRESSURE: 108 MMHG | DIASTOLIC BLOOD PRESSURE: 67 MMHG

## 2019-01-18 VITALS — SYSTOLIC BLOOD PRESSURE: 107 MMHG | DIASTOLIC BLOOD PRESSURE: 65 MMHG

## 2019-01-18 VITALS — DIASTOLIC BLOOD PRESSURE: 62 MMHG | SYSTOLIC BLOOD PRESSURE: 117 MMHG

## 2019-01-18 VITALS — SYSTOLIC BLOOD PRESSURE: 113 MMHG | DIASTOLIC BLOOD PRESSURE: 59 MMHG

## 2019-01-18 VITALS — SYSTOLIC BLOOD PRESSURE: 107 MMHG | DIASTOLIC BLOOD PRESSURE: 64 MMHG

## 2019-01-18 VITALS — DIASTOLIC BLOOD PRESSURE: 68 MMHG | SYSTOLIC BLOOD PRESSURE: 111 MMHG

## 2019-01-18 VITALS — SYSTOLIC BLOOD PRESSURE: 109 MMHG | DIASTOLIC BLOOD PRESSURE: 62 MMHG

## 2019-01-18 VITALS — DIASTOLIC BLOOD PRESSURE: 59 MMHG | SYSTOLIC BLOOD PRESSURE: 97 MMHG

## 2019-01-18 VITALS — DIASTOLIC BLOOD PRESSURE: 58 MMHG | SYSTOLIC BLOOD PRESSURE: 91 MMHG

## 2019-01-18 VITALS — DIASTOLIC BLOOD PRESSURE: 60 MMHG | SYSTOLIC BLOOD PRESSURE: 110 MMHG

## 2019-01-18 VITALS — DIASTOLIC BLOOD PRESSURE: 56 MMHG | SYSTOLIC BLOOD PRESSURE: 88 MMHG

## 2019-01-18 VITALS — SYSTOLIC BLOOD PRESSURE: 116 MMHG | DIASTOLIC BLOOD PRESSURE: 56 MMHG

## 2019-01-18 VITALS — DIASTOLIC BLOOD PRESSURE: 60 MMHG | SYSTOLIC BLOOD PRESSURE: 100 MMHG

## 2019-01-18 VITALS — SYSTOLIC BLOOD PRESSURE: 112 MMHG | DIASTOLIC BLOOD PRESSURE: 65 MMHG

## 2019-01-18 VITALS — SYSTOLIC BLOOD PRESSURE: 104 MMHG | DIASTOLIC BLOOD PRESSURE: 58 MMHG

## 2019-01-18 VITALS — DIASTOLIC BLOOD PRESSURE: 59 MMHG | SYSTOLIC BLOOD PRESSURE: 116 MMHG

## 2019-01-18 VITALS — DIASTOLIC BLOOD PRESSURE: 71 MMHG | SYSTOLIC BLOOD PRESSURE: 119 MMHG

## 2019-01-18 VITALS — DIASTOLIC BLOOD PRESSURE: 67 MMHG | SYSTOLIC BLOOD PRESSURE: 109 MMHG

## 2019-01-18 VITALS — DIASTOLIC BLOOD PRESSURE: 61 MMHG | SYSTOLIC BLOOD PRESSURE: 105 MMHG

## 2019-01-18 VITALS — SYSTOLIC BLOOD PRESSURE: 101 MMHG | DIASTOLIC BLOOD PRESSURE: 58 MMHG

## 2019-01-18 VITALS — SYSTOLIC BLOOD PRESSURE: 113 MMHG | DIASTOLIC BLOOD PRESSURE: 66 MMHG

## 2019-01-18 VITALS — SYSTOLIC BLOOD PRESSURE: 107 MMHG | DIASTOLIC BLOOD PRESSURE: 63 MMHG

## 2019-01-18 VITALS — SYSTOLIC BLOOD PRESSURE: 92 MMHG | DIASTOLIC BLOOD PRESSURE: 59 MMHG

## 2019-01-18 VITALS — DIASTOLIC BLOOD PRESSURE: 65 MMHG | SYSTOLIC BLOOD PRESSURE: 112 MMHG

## 2019-01-18 VITALS — SYSTOLIC BLOOD PRESSURE: 101 MMHG | DIASTOLIC BLOOD PRESSURE: 59 MMHG

## 2019-01-18 VITALS — SYSTOLIC BLOOD PRESSURE: 99 MMHG | DIASTOLIC BLOOD PRESSURE: 63 MMHG

## 2019-01-18 VITALS — SYSTOLIC BLOOD PRESSURE: 111 MMHG | DIASTOLIC BLOOD PRESSURE: 62 MMHG

## 2019-01-18 VITALS — SYSTOLIC BLOOD PRESSURE: 88 MMHG | DIASTOLIC BLOOD PRESSURE: 54 MMHG

## 2019-01-18 VITALS — DIASTOLIC BLOOD PRESSURE: 65 MMHG | SYSTOLIC BLOOD PRESSURE: 110 MMHG

## 2019-01-18 VITALS — SYSTOLIC BLOOD PRESSURE: 113 MMHG | DIASTOLIC BLOOD PRESSURE: 84 MMHG

## 2019-01-18 VITALS — SYSTOLIC BLOOD PRESSURE: 97 MMHG | DIASTOLIC BLOOD PRESSURE: 59 MMHG

## 2019-01-18 VITALS — DIASTOLIC BLOOD PRESSURE: 60 MMHG | SYSTOLIC BLOOD PRESSURE: 107 MMHG

## 2019-01-18 VITALS — SYSTOLIC BLOOD PRESSURE: 111 MMHG | DIASTOLIC BLOOD PRESSURE: 64 MMHG

## 2019-01-18 VITALS — SYSTOLIC BLOOD PRESSURE: 106 MMHG | DIASTOLIC BLOOD PRESSURE: 61 MMHG

## 2019-01-18 VITALS — DIASTOLIC BLOOD PRESSURE: 59 MMHG | SYSTOLIC BLOOD PRESSURE: 101 MMHG

## 2019-01-18 VITALS — SYSTOLIC BLOOD PRESSURE: 104 MMHG | DIASTOLIC BLOOD PRESSURE: 64 MMHG

## 2019-01-18 VITALS — DIASTOLIC BLOOD PRESSURE: 62 MMHG | SYSTOLIC BLOOD PRESSURE: 106 MMHG

## 2019-01-18 VITALS — SYSTOLIC BLOOD PRESSURE: 101 MMHG | DIASTOLIC BLOOD PRESSURE: 61 MMHG

## 2019-01-18 VITALS — SYSTOLIC BLOOD PRESSURE: 114 MMHG | DIASTOLIC BLOOD PRESSURE: 68 MMHG

## 2019-01-18 VITALS — DIASTOLIC BLOOD PRESSURE: 64 MMHG | SYSTOLIC BLOOD PRESSURE: 110 MMHG

## 2019-01-18 VITALS — DIASTOLIC BLOOD PRESSURE: 54 MMHG | SYSTOLIC BLOOD PRESSURE: 92 MMHG

## 2019-01-18 VITALS — DIASTOLIC BLOOD PRESSURE: 64 MMHG | SYSTOLIC BLOOD PRESSURE: 111 MMHG

## 2019-01-18 VITALS — DIASTOLIC BLOOD PRESSURE: 63 MMHG | SYSTOLIC BLOOD PRESSURE: 104 MMHG

## 2019-01-18 VITALS — SYSTOLIC BLOOD PRESSURE: 91 MMHG | DIASTOLIC BLOOD PRESSURE: 58 MMHG

## 2019-01-18 VITALS — SYSTOLIC BLOOD PRESSURE: 105 MMHG | DIASTOLIC BLOOD PRESSURE: 64 MMHG

## 2019-01-18 VITALS — SYSTOLIC BLOOD PRESSURE: 77 MMHG | DIASTOLIC BLOOD PRESSURE: 54 MMHG

## 2019-01-18 VITALS — SYSTOLIC BLOOD PRESSURE: 105 MMHG | DIASTOLIC BLOOD PRESSURE: 63 MMHG

## 2019-01-18 VITALS — DIASTOLIC BLOOD PRESSURE: 67 MMHG | SYSTOLIC BLOOD PRESSURE: 119 MMHG

## 2019-01-18 VITALS — DIASTOLIC BLOOD PRESSURE: 65 MMHG | SYSTOLIC BLOOD PRESSURE: 104 MMHG

## 2019-01-18 VITALS — DIASTOLIC BLOOD PRESSURE: 46 MMHG | SYSTOLIC BLOOD PRESSURE: 107 MMHG

## 2019-01-18 VITALS — SYSTOLIC BLOOD PRESSURE: 107 MMHG | DIASTOLIC BLOOD PRESSURE: 62 MMHG

## 2019-01-18 VITALS — DIASTOLIC BLOOD PRESSURE: 63 MMHG | SYSTOLIC BLOOD PRESSURE: 108 MMHG

## 2019-01-18 VITALS — DIASTOLIC BLOOD PRESSURE: 46 MMHG | SYSTOLIC BLOOD PRESSURE: 102 MMHG

## 2019-01-18 VITALS — DIASTOLIC BLOOD PRESSURE: 59 MMHG | SYSTOLIC BLOOD PRESSURE: 117 MMHG

## 2019-01-18 VITALS — DIASTOLIC BLOOD PRESSURE: 61 MMHG | SYSTOLIC BLOOD PRESSURE: 103 MMHG

## 2019-01-18 VITALS — SYSTOLIC BLOOD PRESSURE: 116 MMHG | DIASTOLIC BLOOD PRESSURE: 70 MMHG

## 2019-01-18 LAB
ALBUMIN SERPL-MCNC: 2.5 G/DL (ref 3.4–5)
ALP SERPL-CCNC: 26 U/L (ref 46–116)
ALT SERPL-CCNC: 67 U/L (ref 10–68)
ANION GAP SERPL CALC-SCNC: 12.9 MMOL/L (ref 8–16)
BILIRUB SERPL-MCNC: 0.69 MG/DL (ref 0.2–1.3)
BUN SERPL-MCNC: 15 MG/DL (ref 7–18)
CALCIUM SERPL-MCNC: 7 MG/DL (ref 8.5–10.1)
CHLORIDE SERPL-SCNC: 105 MMOL/L (ref 98–107)
CO2 SERPL-SCNC: 27.1 MMOL/L (ref 21–32)
CREAT SERPL-MCNC: 1.1 MG/DL (ref 0.6–1.3)
ERYTHROCYTE [DISTWIDTH] IN BLOOD BY AUTOMATED COUNT: 13 % (ref 11.5–14.5)
GLOBULIN SER-MCNC: 2.7 G/L
GLUCOSE SERPL-MCNC: 160 MG/DL (ref 74–106)
HCT VFR BLD CALC: 33.7 % (ref 42–54)
HGB BLD-MCNC: 11.1 G/DL (ref 13.5–17.5)
MCH RBC QN AUTO: 29.6 PG (ref 26–34)
MCHC RBC AUTO-ENTMCNC: 32.9 G/DL (ref 31–37)
MCV RBC: 89.9 FL (ref 80–100)
OSMOLALITY SERPL CALC.SUM OF ELEC: 284 MOSM/KG (ref 275–300)
PLATELET # BLD: 112 10X3/UL (ref 130–400)
PMV BLD AUTO: 10.2 FL (ref 7.4–10.4)
POTASSIUM SERPL-SCNC: 4 MMOL/L (ref 3.5–5.1)
PROT SERPL-MCNC: 5.2 G/DL (ref 6.4–8.2)
RBC # BLD AUTO: 3.75 10X6/UL (ref 4.2–6.1)
SODIUM SERPL-SCNC: 141 MMOL/L (ref 136–145)
WBC # BLD AUTO: 14.4 10X3/UL (ref 4.8–10.8)

## 2019-01-18 NOTE — OP
PATIENT NAME:  BRENNON KELLY                          MEDICAL RECORD: [id number]
:58                                             LOCATION:[id number]
                                                         ADMISSION DATE:19
SURGEON:  ALEJANDRO PRADO MD            
 
 
DATE OF OPERATION:  2019
 
6SURGEON:  Alejandro Prado MD
 
ASSISTANT:  MARITZA Leone MD and Feng Reynoso.
 
OPERATION PERFORMED:
1. Aortic valve replacement (21-mm Turcios pericardial bioprosthesis).
2. Coronary artery bypass graft times 3 (left internal mammary artery to LAD,
reverse saphenous vein graft from aorta to first diagonal and aorta to posterior
descending artery branch of circumflex coronary artery).
 
PREOPERATIVE DIAGNOSES:  Aortic stenosis and coronary artery disease.
 
POSTOPERATIVE DIAGNOSES:  Aortic stenosis and coronary artery disease.
 
ANESTHESIA:  General endotracheal anesthesia.
 
ESTIMATED BLOOD LOSS:  Total cardiopulmonary bypass with Cell Saver
retransfusion.
 
COMPLICATIONS:  None.
 
CONDITION:  Stable.
 
SPECIMENS:  Mediastinal lymph node.
 
DISPOSITION:  [facility] ICU.
 
OPERATIVE FINDINGS:
1. Transesophageal echocardiography confirmed, severe aortic stenosis, good
contractility, no perivalvular leak after aortic valve replacement.
2. Small greater saphenous vein near the knee, a dual system in the thigh up to
the upper thigh, so endoscopic vein harvest was aborted.  One thin-walled and
smaller caliber resection was used for the diagonal graft and the best caliber
piece for the posterior descending graft.
3. Good quality left internal mammary artery.  The LAD was a severely diseased
1.5 mm vessel.
4. The bifurcating diagonal inferior branch was 1.25 mm severely diseased
vessel.
5. The posterior descending artery was a 1.5 mm severely diseased vessel.
6. Severe aortic valve leaflet and annular calcification.  Additional specimen
aortic valve leaflets.
 
OPERATIVE INDICATION:  Aortic stenosis and coronary artery disease.
 
DESCRIPTION OF PROCEDURE:  The patient was brought to the operating suite. 
General anesthesia was obtained, the patient was prepped and draped.  Greater
saphenous vein was harvested from the patient's right lower extremity initially
with endoscopic harvest and then using bridging incisions.  This portion of the
procedure was performed by Dr. Leone, the assistant.  The use of an assistant
surgeon saved approximately 1 hour of general anesthetic time.  The vessel was
 
 
 
OPERATIVE REPORT                               [id number]    BRENNON KELLY        
 
 
removed.  Side branches were clipped and thin sites were oversewn.  Leg was
later closed in 2 layers with a drain.
 
Median sternotomy incision was made.  Subcutaneous tissue was divided by
electrocautery.  The sternum was divided with a saw.  The left hemisternum was
elevated.  The left pleural cavity was entered.  Left internal mammary vein was
taken as a pedicle graft.  Sternal retractor was placed.  Pericardium was
opened.  Heparin was given.  Aorta was cannulated.  Dual stage venous cannula
was inserted.  Activated clotting time was appropriately elevated.  The patient
placed on cardiopulmonary bypass.  Sites for distal anastomoses were selected. 
Retrograde cardioplegic cannula was inserted.  Antegrade cardioplegia cannula
was inserted.  The patient was cooled.  Crossclamp was placed.  Cardioplegia
given antegrade and retrograde and this repeated at 15 to 20 intervals including
down the completed vein grafts.  Left ventricular vent was placed through right
superior pulmonary vein.  Distal anastomoses were performed in standard
technique.  The left internal mammary artery anastomosis was redone due to a
tear in the thin portion of the internal mammary at the site of anastomosis, but
good Doppler signal after anastomosis and after reversal of heparin.  A
transverse aortotomy was performed.  Aortic valve was visualized, debrided
careful to remove all bits of debris with thorough irrigation and the valve was
sized to appropriate size.  Pledgeted sutures were placed from ventricular to
the aortic side.  Then, through the valve sewing ring, valve was carefully
lowered in place and sutures were tied inspect the valve.  There was no
subvalvular obstruction and there was no perivalvular spaces for leak. 
Aortotomy was closed with double running pledgeted suture line.  Two punch sites
were made.  Proximal anastomoses were performed and prior to tying the
anastomoses, the patient was placed in Trendelenburg position and utilizing
transesophageal echocardiography for assistance in deairing.  The ventricular
apex was de-aired with Valsalva maneuver and then the crossclamp was removed. 
Proximal anastomoses were tied down.  Vein grafts were de-aired.  Flow was
restored.  Proximal and distal anastomotic sites were inspected for bleeding. 
The patient resumed spontaneous rhythm.  Atrial and ventricular pacing wires
were placed.  The patient was fully rewarmed, weaned from cardiopulmonary bypass
and stable.
 
The patient was decannulated.  The cannula sites were oversewn.  Protamine was
given.  Antibiotic irrigation ensued.  The graft lay appropriately.  Drains were
placed in mediastinum both pleural cavities.  Pericardial fat was loosely
reapproximated.  The left chest evacuated and irrigated.  The internal mammary
harvest site was hemostatic.  The sternum was closed with wires.  Fascia was
closed.  Subcutaneous tissue closed.  Skin was closed.  Dermabond was placed. 
The needle and sponge counts were correct.  The patient was taken to ICU in
stable condition.
 
TRANSINT:[id number] Voice Confirmation ID: [id number] DOCUMENT ID: [id number]
                                           
                                           ALEJANDRO PRADO MD            
 
 
 
Electronically Signed by ALEJANDRO PRADO on 19 at 1344
CC: VIOLA CRAIG MD                                        [id number]
DICTATION DATE: 19     :     199      ADM IN  
                                                                              
[id number] [place] [address]

## 2019-01-19 VITALS — DIASTOLIC BLOOD PRESSURE: 62 MMHG | SYSTOLIC BLOOD PRESSURE: 104 MMHG

## 2019-01-19 VITALS — DIASTOLIC BLOOD PRESSURE: 69 MMHG | SYSTOLIC BLOOD PRESSURE: 101 MMHG

## 2019-01-19 VITALS — DIASTOLIC BLOOD PRESSURE: 63 MMHG | SYSTOLIC BLOOD PRESSURE: 121 MMHG

## 2019-01-19 VITALS — DIASTOLIC BLOOD PRESSURE: 76 MMHG | SYSTOLIC BLOOD PRESSURE: 121 MMHG

## 2019-01-19 VITALS — DIASTOLIC BLOOD PRESSURE: 61 MMHG | SYSTOLIC BLOOD PRESSURE: 109 MMHG

## 2019-01-19 VITALS — SYSTOLIC BLOOD PRESSURE: 131 MMHG | DIASTOLIC BLOOD PRESSURE: 72 MMHG

## 2019-01-19 VITALS — DIASTOLIC BLOOD PRESSURE: 67 MMHG | SYSTOLIC BLOOD PRESSURE: 101 MMHG

## 2019-01-19 VITALS — DIASTOLIC BLOOD PRESSURE: 69 MMHG | SYSTOLIC BLOOD PRESSURE: 144 MMHG

## 2019-01-19 VITALS — SYSTOLIC BLOOD PRESSURE: 112 MMHG | DIASTOLIC BLOOD PRESSURE: 45 MMHG

## 2019-01-19 VITALS — SYSTOLIC BLOOD PRESSURE: 117 MMHG | DIASTOLIC BLOOD PRESSURE: 68 MMHG

## 2019-01-19 VITALS — DIASTOLIC BLOOD PRESSURE: 68 MMHG | SYSTOLIC BLOOD PRESSURE: 101 MMHG

## 2019-01-19 VITALS — DIASTOLIC BLOOD PRESSURE: 79 MMHG | SYSTOLIC BLOOD PRESSURE: 124 MMHG

## 2019-01-19 VITALS — SYSTOLIC BLOOD PRESSURE: 101 MMHG | DIASTOLIC BLOOD PRESSURE: 67 MMHG

## 2019-01-19 VITALS — DIASTOLIC BLOOD PRESSURE: 54 MMHG | SYSTOLIC BLOOD PRESSURE: 110 MMHG

## 2019-01-19 VITALS — SYSTOLIC BLOOD PRESSURE: 123 MMHG | DIASTOLIC BLOOD PRESSURE: 75 MMHG

## 2019-01-19 VITALS — SYSTOLIC BLOOD PRESSURE: 148 MMHG | DIASTOLIC BLOOD PRESSURE: 70 MMHG

## 2019-01-19 VITALS — SYSTOLIC BLOOD PRESSURE: 117 MMHG | DIASTOLIC BLOOD PRESSURE: 66 MMHG

## 2019-01-19 VITALS — DIASTOLIC BLOOD PRESSURE: 71 MMHG | SYSTOLIC BLOOD PRESSURE: 108 MMHG

## 2019-01-19 VITALS — SYSTOLIC BLOOD PRESSURE: 129 MMHG | DIASTOLIC BLOOD PRESSURE: 72 MMHG

## 2019-01-19 VITALS — DIASTOLIC BLOOD PRESSURE: 66 MMHG | SYSTOLIC BLOOD PRESSURE: 117 MMHG

## 2019-01-19 VITALS — SYSTOLIC BLOOD PRESSURE: 109 MMHG | DIASTOLIC BLOOD PRESSURE: 61 MMHG

## 2019-01-19 VITALS — DIASTOLIC BLOOD PRESSURE: 68 MMHG | SYSTOLIC BLOOD PRESSURE: 117 MMHG

## 2019-01-19 VITALS — SYSTOLIC BLOOD PRESSURE: 121 MMHG | DIASTOLIC BLOOD PRESSURE: 68 MMHG

## 2019-01-19 VITALS — SYSTOLIC BLOOD PRESSURE: 112 MMHG | DIASTOLIC BLOOD PRESSURE: 67 MMHG

## 2019-01-19 VITALS — DIASTOLIC BLOOD PRESSURE: 68 MMHG | SYSTOLIC BLOOD PRESSURE: 107 MMHG

## 2019-01-19 VITALS — SYSTOLIC BLOOD PRESSURE: 103 MMHG | DIASTOLIC BLOOD PRESSURE: 68 MMHG

## 2019-01-19 VITALS — DIASTOLIC BLOOD PRESSURE: 65 MMHG | SYSTOLIC BLOOD PRESSURE: 115 MMHG

## 2019-01-19 VITALS — SYSTOLIC BLOOD PRESSURE: 101 MMHG | DIASTOLIC BLOOD PRESSURE: 69 MMHG

## 2019-01-19 VITALS — DIASTOLIC BLOOD PRESSURE: 70 MMHG | SYSTOLIC BLOOD PRESSURE: 120 MMHG

## 2019-01-19 VITALS — DIASTOLIC BLOOD PRESSURE: 74 MMHG | SYSTOLIC BLOOD PRESSURE: 122 MMHG

## 2019-01-19 VITALS — SYSTOLIC BLOOD PRESSURE: 120 MMHG | DIASTOLIC BLOOD PRESSURE: 79 MMHG

## 2019-01-19 VITALS — SYSTOLIC BLOOD PRESSURE: 113 MMHG | DIASTOLIC BLOOD PRESSURE: 64 MMHG

## 2019-01-19 VITALS — DIASTOLIC BLOOD PRESSURE: 63 MMHG | SYSTOLIC BLOOD PRESSURE: 99 MMHG

## 2019-01-19 VITALS — SYSTOLIC BLOOD PRESSURE: 126 MMHG | DIASTOLIC BLOOD PRESSURE: 71 MMHG

## 2019-01-19 VITALS — DIASTOLIC BLOOD PRESSURE: 75 MMHG | SYSTOLIC BLOOD PRESSURE: 123 MMHG

## 2019-01-19 VITALS — SYSTOLIC BLOOD PRESSURE: 108 MMHG | DIASTOLIC BLOOD PRESSURE: 63 MMHG

## 2019-01-19 VITALS — DIASTOLIC BLOOD PRESSURE: 60 MMHG | SYSTOLIC BLOOD PRESSURE: 105 MMHG

## 2019-01-19 VITALS — DIASTOLIC BLOOD PRESSURE: 64 MMHG | SYSTOLIC BLOOD PRESSURE: 113 MMHG

## 2019-01-19 VITALS — SYSTOLIC BLOOD PRESSURE: 103 MMHG | DIASTOLIC BLOOD PRESSURE: 72 MMHG

## 2019-01-19 VITALS — SYSTOLIC BLOOD PRESSURE: 124 MMHG | DIASTOLIC BLOOD PRESSURE: 79 MMHG

## 2019-01-19 VITALS — SYSTOLIC BLOOD PRESSURE: 121 MMHG | DIASTOLIC BLOOD PRESSURE: 75 MMHG

## 2019-01-19 VITALS — DIASTOLIC BLOOD PRESSURE: 77 MMHG | SYSTOLIC BLOOD PRESSURE: 128 MMHG

## 2019-01-19 VITALS — SYSTOLIC BLOOD PRESSURE: 120 MMHG | DIASTOLIC BLOOD PRESSURE: 71 MMHG

## 2019-01-19 VITALS — DIASTOLIC BLOOD PRESSURE: 72 MMHG | SYSTOLIC BLOOD PRESSURE: 131 MMHG

## 2019-01-19 VITALS — DIASTOLIC BLOOD PRESSURE: 62 MMHG | SYSTOLIC BLOOD PRESSURE: 113 MMHG

## 2019-01-19 VITALS — DIASTOLIC BLOOD PRESSURE: 80 MMHG | SYSTOLIC BLOOD PRESSURE: 129 MMHG

## 2019-01-19 VITALS — DIASTOLIC BLOOD PRESSURE: 68 MMHG | SYSTOLIC BLOOD PRESSURE: 136 MMHG

## 2019-01-19 VITALS — DIASTOLIC BLOOD PRESSURE: 68 MMHG | SYSTOLIC BLOOD PRESSURE: 111 MMHG

## 2019-01-19 VITALS — SYSTOLIC BLOOD PRESSURE: 120 MMHG | DIASTOLIC BLOOD PRESSURE: 70 MMHG

## 2019-01-19 VITALS — DIASTOLIC BLOOD PRESSURE: 94 MMHG | SYSTOLIC BLOOD PRESSURE: 123 MMHG

## 2019-01-19 VITALS — SYSTOLIC BLOOD PRESSURE: 108 MMHG | DIASTOLIC BLOOD PRESSURE: 58 MMHG

## 2019-01-19 LAB
ALBUMIN SERPL-MCNC: 2.3 G/DL (ref 3.4–5)
ALP SERPL-CCNC: 25 U/L (ref 46–116)
ALT SERPL-CCNC: 89 U/L (ref 10–68)
ANION GAP SERPL CALC-SCNC: 10.8 MMOL/L (ref 8–16)
BILIRUB SERPL-MCNC: 0.62 MG/DL (ref 0.2–1.3)
BUN SERPL-MCNC: 17 MG/DL (ref 7–18)
CALCIUM SERPL-MCNC: 7.5 MG/DL (ref 8.5–10.1)
CHLORIDE SERPL-SCNC: 101 MMOL/L (ref 98–107)
CO2 SERPL-SCNC: 28.4 MMOL/L (ref 21–32)
CREAT SERPL-MCNC: 0.9 MG/DL (ref 0.6–1.3)
ERYTHROCYTE [DISTWIDTH] IN BLOOD BY AUTOMATED COUNT: 12.8 % (ref 11.5–14.5)
GLOBULIN SER-MCNC: 3 G/L
GLUCOSE SERPL-MCNC: 138 MG/DL (ref 74–106)
HCT VFR BLD CALC: 26 % (ref 42–54)
HGB BLD-MCNC: 8.8 G/DL (ref 13.5–17.5)
MCH RBC QN AUTO: 29.9 PG (ref 26–34)
MCHC RBC AUTO-ENTMCNC: 33.8 G/DL (ref 31–37)
MCV RBC: 88.4 FL (ref 80–100)
OSMOLALITY SERPL CALC.SUM OF ELEC: 273 MOSM/KG (ref 275–300)
PLATELET # BLD: 98 10X3/UL (ref 130–400)
PMV BLD AUTO: 10.6 FL (ref 7.4–10.4)
POTASSIUM SERPL-SCNC: 5.2 MMOL/L (ref 3.5–5.1)
PROT SERPL-MCNC: 5.3 G/DL (ref 6.4–8.2)
RBC # BLD AUTO: 2.94 10X6/UL (ref 4.2–6.1)
SODIUM SERPL-SCNC: 135 MMOL/L (ref 136–145)
WBC # BLD AUTO: 17.4 10X3/UL (ref 4.8–10.8)

## 2019-01-20 VITALS — SYSTOLIC BLOOD PRESSURE: 115 MMHG | DIASTOLIC BLOOD PRESSURE: 68 MMHG

## 2019-01-20 VITALS — SYSTOLIC BLOOD PRESSURE: 134 MMHG | DIASTOLIC BLOOD PRESSURE: 52 MMHG

## 2019-01-20 VITALS — DIASTOLIC BLOOD PRESSURE: 60 MMHG | SYSTOLIC BLOOD PRESSURE: 103 MMHG

## 2019-01-20 VITALS — DIASTOLIC BLOOD PRESSURE: 60 MMHG | SYSTOLIC BLOOD PRESSURE: 101 MMHG

## 2019-01-20 VITALS — SYSTOLIC BLOOD PRESSURE: 139 MMHG | DIASTOLIC BLOOD PRESSURE: 78 MMHG

## 2019-01-20 VITALS — DIASTOLIC BLOOD PRESSURE: 78 MMHG | SYSTOLIC BLOOD PRESSURE: 139 MMHG

## 2019-01-20 VITALS — SYSTOLIC BLOOD PRESSURE: 101 MMHG | DIASTOLIC BLOOD PRESSURE: 61 MMHG

## 2019-01-20 VITALS — DIASTOLIC BLOOD PRESSURE: 61 MMHG | SYSTOLIC BLOOD PRESSURE: 104 MMHG

## 2019-01-20 VITALS — SYSTOLIC BLOOD PRESSURE: 59 MMHG | DIASTOLIC BLOOD PRESSURE: 38 MMHG

## 2019-01-20 VITALS — SYSTOLIC BLOOD PRESSURE: 114 MMHG | DIASTOLIC BLOOD PRESSURE: 67 MMHG

## 2019-01-20 VITALS — DIASTOLIC BLOOD PRESSURE: 66 MMHG | SYSTOLIC BLOOD PRESSURE: 122 MMHG

## 2019-01-20 VITALS — DIASTOLIC BLOOD PRESSURE: 56 MMHG | SYSTOLIC BLOOD PRESSURE: 98 MMHG

## 2019-01-20 VITALS — DIASTOLIC BLOOD PRESSURE: 70 MMHG | SYSTOLIC BLOOD PRESSURE: 113 MMHG

## 2019-01-20 VITALS — SYSTOLIC BLOOD PRESSURE: 107 MMHG | DIASTOLIC BLOOD PRESSURE: 71 MMHG

## 2019-01-20 VITALS — SYSTOLIC BLOOD PRESSURE: 111 MMHG | DIASTOLIC BLOOD PRESSURE: 60 MMHG

## 2019-01-20 VITALS — DIASTOLIC BLOOD PRESSURE: 79 MMHG | SYSTOLIC BLOOD PRESSURE: 112 MMHG

## 2019-01-20 VITALS — SYSTOLIC BLOOD PRESSURE: 113 MMHG | DIASTOLIC BLOOD PRESSURE: 67 MMHG

## 2019-01-20 VITALS — SYSTOLIC BLOOD PRESSURE: 101 MMHG | DIASTOLIC BLOOD PRESSURE: 59 MMHG

## 2019-01-20 VITALS — SYSTOLIC BLOOD PRESSURE: 116 MMHG | DIASTOLIC BLOOD PRESSURE: 81 MMHG

## 2019-01-20 VITALS — SYSTOLIC BLOOD PRESSURE: 106 MMHG | DIASTOLIC BLOOD PRESSURE: 65 MMHG

## 2019-01-20 VITALS — SYSTOLIC BLOOD PRESSURE: 140 MMHG | DIASTOLIC BLOOD PRESSURE: 75 MMHG

## 2019-01-20 VITALS — DIASTOLIC BLOOD PRESSURE: 66 MMHG | SYSTOLIC BLOOD PRESSURE: 103 MMHG

## 2019-01-20 VITALS — DIASTOLIC BLOOD PRESSURE: 71 MMHG | SYSTOLIC BLOOD PRESSURE: 107 MMHG

## 2019-01-20 VITALS — DIASTOLIC BLOOD PRESSURE: 81 MMHG | SYSTOLIC BLOOD PRESSURE: 116 MMHG

## 2019-01-20 VITALS — DIASTOLIC BLOOD PRESSURE: 73 MMHG | SYSTOLIC BLOOD PRESSURE: 132 MMHG

## 2019-01-20 VITALS — DIASTOLIC BLOOD PRESSURE: 80 MMHG | SYSTOLIC BLOOD PRESSURE: 107 MMHG

## 2019-01-20 VITALS — DIASTOLIC BLOOD PRESSURE: 65 MMHG | SYSTOLIC BLOOD PRESSURE: 89 MMHG

## 2019-01-20 VITALS — SYSTOLIC BLOOD PRESSURE: 58 MMHG | DIASTOLIC BLOOD PRESSURE: 40 MMHG

## 2019-01-20 VITALS — SYSTOLIC BLOOD PRESSURE: 106 MMHG | DIASTOLIC BLOOD PRESSURE: 66 MMHG

## 2019-01-20 LAB
ALBUMIN SERPL-MCNC: 2.5 G/DL (ref 3.4–5)
ALP SERPL-CCNC: 31 U/L (ref 46–116)
ALT SERPL-CCNC: 98 U/L (ref 10–68)
ANION GAP SERPL CALC-SCNC: 13 MMOL/L (ref 8–16)
BILIRUB SERPL-MCNC: 0.53 MG/DL (ref 0.2–1.3)
BUN SERPL-MCNC: 22 MG/DL (ref 7–18)
CALCIUM SERPL-MCNC: 7.5 MG/DL (ref 8.5–10.1)
CHLORIDE SERPL-SCNC: 99 MMOL/L (ref 98–107)
CO2 SERPL-SCNC: 28.4 MMOL/L (ref 21–32)
CREAT SERPL-MCNC: 1 MG/DL (ref 0.6–1.3)
ERYTHROCYTE [DISTWIDTH] IN BLOOD BY AUTOMATED COUNT: 13 % (ref 11.5–14.5)
GLOBULIN SER-MCNC: 3 G/L
GLUCOSE SERPL-MCNC: 135 MG/DL (ref 74–106)
HCT VFR BLD CALC: 25.9 % (ref 42–54)
HGB BLD-MCNC: 8.6 G/DL (ref 13.5–17.5)
MCH RBC QN AUTO: 29.5 PG (ref 26–34)
MCHC RBC AUTO-ENTMCNC: 33.2 G/DL (ref 31–37)
MCV RBC: 88.7 FL (ref 80–100)
OSMOLALITY SERPL CALC.SUM OF ELEC: 278 MOSM/KG (ref 275–300)
PLATELET # BLD: 129 10X3/UL (ref 130–400)
PMV BLD AUTO: 10.7 FL (ref 7.4–10.4)
POTASSIUM SERPL-SCNC: 3.4 MMOL/L (ref 3.5–5.1)
PROT SERPL-MCNC: 5.5 G/DL (ref 6.4–8.2)
RBC # BLD AUTO: 2.92 10X6/UL (ref 4.2–6.1)
SODIUM SERPL-SCNC: 137 MMOL/L (ref 136–145)
WBC # BLD AUTO: 17 10X3/UL (ref 4.8–10.8)

## 2019-01-21 VITALS — DIASTOLIC BLOOD PRESSURE: 62 MMHG | SYSTOLIC BLOOD PRESSURE: 89 MMHG

## 2019-01-21 VITALS — SYSTOLIC BLOOD PRESSURE: 104 MMHG | DIASTOLIC BLOOD PRESSURE: 71 MMHG

## 2019-01-21 VITALS — SYSTOLIC BLOOD PRESSURE: 109 MMHG | DIASTOLIC BLOOD PRESSURE: 55 MMHG

## 2019-01-21 VITALS — DIASTOLIC BLOOD PRESSURE: 56 MMHG | SYSTOLIC BLOOD PRESSURE: 102 MMHG

## 2019-01-21 VITALS — DIASTOLIC BLOOD PRESSURE: 67 MMHG | SYSTOLIC BLOOD PRESSURE: 116 MMHG

## 2019-01-21 VITALS — SYSTOLIC BLOOD PRESSURE: 116 MMHG | DIASTOLIC BLOOD PRESSURE: 69 MMHG

## 2019-01-21 VITALS — DIASTOLIC BLOOD PRESSURE: 59 MMHG | SYSTOLIC BLOOD PRESSURE: 106 MMHG

## 2019-01-21 VITALS — SYSTOLIC BLOOD PRESSURE: 106 MMHG | DIASTOLIC BLOOD PRESSURE: 60 MMHG

## 2019-01-21 VITALS — DIASTOLIC BLOOD PRESSURE: 63 MMHG | SYSTOLIC BLOOD PRESSURE: 98 MMHG

## 2019-01-21 VITALS — SYSTOLIC BLOOD PRESSURE: 113 MMHG | DIASTOLIC BLOOD PRESSURE: 65 MMHG

## 2019-01-21 VITALS — SYSTOLIC BLOOD PRESSURE: 102 MMHG | DIASTOLIC BLOOD PRESSURE: 55 MMHG

## 2019-01-21 VITALS — SYSTOLIC BLOOD PRESSURE: 138 MMHG | DIASTOLIC BLOOD PRESSURE: 78 MMHG

## 2019-01-21 VITALS — SYSTOLIC BLOOD PRESSURE: 84 MMHG | DIASTOLIC BLOOD PRESSURE: 66 MMHG

## 2019-01-21 VITALS — DIASTOLIC BLOOD PRESSURE: 76 MMHG | SYSTOLIC BLOOD PRESSURE: 113 MMHG

## 2019-01-21 VITALS — SYSTOLIC BLOOD PRESSURE: 118 MMHG | DIASTOLIC BLOOD PRESSURE: 69 MMHG

## 2019-01-21 VITALS — DIASTOLIC BLOOD PRESSURE: 80 MMHG | SYSTOLIC BLOOD PRESSURE: 111 MMHG

## 2019-01-21 VITALS — DIASTOLIC BLOOD PRESSURE: 56 MMHG | SYSTOLIC BLOOD PRESSURE: 113 MMHG

## 2019-01-21 VITALS — DIASTOLIC BLOOD PRESSURE: 81 MMHG | SYSTOLIC BLOOD PRESSURE: 130 MMHG

## 2019-01-21 VITALS — SYSTOLIC BLOOD PRESSURE: 113 MMHG | DIASTOLIC BLOOD PRESSURE: 56 MMHG

## 2019-01-21 VITALS — SYSTOLIC BLOOD PRESSURE: 116 MMHG | DIASTOLIC BLOOD PRESSURE: 78 MMHG

## 2019-01-21 VITALS — SYSTOLIC BLOOD PRESSURE: 104 MMHG | DIASTOLIC BLOOD PRESSURE: 63 MMHG

## 2019-01-21 VITALS — DIASTOLIC BLOOD PRESSURE: 66 MMHG | SYSTOLIC BLOOD PRESSURE: 119 MMHG

## 2019-01-21 VITALS — DIASTOLIC BLOOD PRESSURE: 60 MMHG | SYSTOLIC BLOOD PRESSURE: 106 MMHG

## 2019-01-21 VITALS — SYSTOLIC BLOOD PRESSURE: 97 MMHG | DIASTOLIC BLOOD PRESSURE: 64 MMHG

## 2019-01-21 VITALS — SYSTOLIC BLOOD PRESSURE: 109 MMHG | DIASTOLIC BLOOD PRESSURE: 64 MMHG

## 2019-01-21 LAB
ALBUMIN SERPL-MCNC: 2.2 G/DL (ref 3.4–5)
ALP SERPL-CCNC: 34 U/L (ref 46–116)
ALT SERPL-CCNC: 85 U/L (ref 10–68)
ANION GAP SERPL CALC-SCNC: 10.2 MMOL/L (ref 8–16)
BILIRUB SERPL-MCNC: 0.48 MG/DL (ref 0.2–1.3)
BUN SERPL-MCNC: 24 MG/DL (ref 7–18)
CALCIUM SERPL-MCNC: 7.2 MG/DL (ref 8.5–10.1)
CHLORIDE SERPL-SCNC: 97 MMOL/L (ref 98–107)
CO2 SERPL-SCNC: 27.7 MMOL/L (ref 21–32)
CREAT SERPL-MCNC: 1 MG/DL (ref 0.6–1.3)
ERYTHROCYTE [DISTWIDTH] IN BLOOD BY AUTOMATED COUNT: 13.6 % (ref 11.5–14.5)
GLOBULIN SER-MCNC: 3.6 G/L
GLUCOSE SERPL-MCNC: 108 MG/DL (ref 74–106)
HCT VFR BLD CALC: 23.2 % (ref 42–54)
HGB BLD-MCNC: 7.7 G/DL (ref 13.5–17.5)
MCH RBC QN AUTO: 30.1 PG (ref 26–34)
MCHC RBC AUTO-ENTMCNC: 33.2 G/DL (ref 31–37)
MCV RBC: 90.6 FL (ref 80–100)
OSMOLALITY SERPL CALC.SUM OF ELEC: 267 MOSM/KG (ref 275–300)
PLATELET # BLD: 163 10X3/UL (ref 130–400)
PMV BLD AUTO: 10.7 FL (ref 7.4–10.4)
POTASSIUM SERPL-SCNC: 3.9 MMOL/L (ref 3.5–5.1)
PROT SERPL-MCNC: 5.8 G/DL (ref 6.4–8.2)
RBC # BLD AUTO: 2.56 10X6/UL (ref 4.2–6.1)
SODIUM SERPL-SCNC: 131 MMOL/L (ref 136–145)
WBC # BLD AUTO: 14.6 10X3/UL (ref 4.8–10.8)

## 2019-01-21 NOTE — TEE
PATIENT:BRENNON KELLY                MEDICAL RECORD: [id number]
                                               LOCATION:[place] [address]
AGE OF PATIENT: 58                             ADMISSION DATE: 01/17/19
SEX: M   
 
REFERRING PHYSICIAN:                                                             
 
INTERPRETING PHYSICIAN: VIOLA CRAIG MD          

 
 
                         TRANSESOPHAGEAL ECHOCARDIOGRAM
 Date:              01/17/19      RAFFY CHARGE Y
                INDICATIONS: CABG/AVR                 
 
             PREMEDICATIONS:                               
 
PATIENT'S RESPONSE PROCEDURE                          
 
                     DOPPLER MEASUREMENTS:
            LVIT            LA           PA           RA      
            LVOT          RVOT      Asc. Ao      
AV Gradient Peak       AV Mean      AV Area      
MV Gradient Peak       MV Mean      MV Area      
 INTERPRETATION:                          
 
 
 
 
               Doppler:                          
 
                   2-D:                          
 
     COLOR FLOW DOPPLER                          
 
   NORMAL SALINE STUDY:                     
 
          MISCELLANOUS:                          
 
             DIAGNOSIS:                          
 
                  PLAN:                          
 
           Cardiologist:3          Dr. De Dios             
   Cardiac Sonographer: Jacquelyn RESTREPO              
              COMMENTS:                                              
                                                                                     
 
 
DATE OF SERVICE:  01/17/2019
 
Intraoperative Transesophageal Echocardiogram
 
Preoperatively, LVH is present.  LV internal dimensions and wall motion are
normal.  The aortic valve is obviously stenotic with restriction of leaflet
motion.  Postoperatively, LV function remains normal with good regional wall
motion.  Prosthetic aortic valve is noted without significant valve dysfunction.
 
 
 
 
TRANSESOPHAGEAL ECHOCARDIOGRAM REPORT                    [id number]    BRENNON KELLY
 
 
TRANSINT:[id number] Voice Confirmation ID: [id number] DOCUMENT ID: [id number]
 
 
 
Electronically Signed by VIOLA CRAIG on 01/21/19 at 1526
 
 
 
 
 
 
 
 
 
 
 
 
 
 
 
 
 
 
 
 
 
 
 
 
 
 
 
 
 
 
 
 
 
 
 
 
 
 
 
 
 
CC:                                                             [id number]
DICTATION DATE: 01/17/19 1530     :     01/18/19 0032      ADM IN  
                                                                              
[facility]                                          
[address] [id number]

## 2019-01-21 NOTE — MORECARE
CASE MANAGEMENT DISCHARGE SUMMARY
 
 
PATIENT: BRENNON KELLY PILAR                    UNIT: [id number]
ACCOUNT#: [id number]                       ADM DATE: 19
AGE: 58     : 58  SEX: M            [facility]    
AUTHOR: HERNAN HANNAH                             PHYSICIAN:                               
 
REFERRING PHYSICIAN: HIMANSHU PRADO MD            
DATE OF SERVICE: 19
Discharge Plan
 
 
Patient Name: BRENNON KELLY
[facility]
Encounter #: [id number]
Medical Record #: [id number]
: 1960
Planned Disposition: Home
Anticipated Discharge Date: 
 
Discharge Date: 
Expected LOS: 
Initial Reviewer: [email or web address]
Initial Review Date: 2019
Generated: 19   1:19 pm 
 DCPIA - Discharge Planning Initial Assessment
 
Updated by [id number]: Marcella Smith on 19  12:18 pm
*  Is the patient Alert and Oriented?
Yes
*  How many steps to enter\exit or inside your home?
 
*  PCP
[facility]
*  Pharmacy
[facility]
*  Preadmission Environment
Home with Family
*  ADLs
Independent
*  Other Equipment
WALKER, CANE, WHEELCHAIR AVAILABLE IF NEEDED
*  List name and contact numbers for known caregivers / representatives who 
currently or will assist patient after discharge:
ROSE KELLY - [facility] - [phone]
*  Verbal permission to speak to the caregivers and representatives has been 
obtained from the patient.
Yes
*  Community resources currently utilized
 
None
*  Additional services required to return to the preadmission environment?
No
*  Can the patient safely return to the preadmission environment?
Yes
*  Has this patient been hospitalized within the prior 30 days at any 
hospital?
No
 
 
 
 
 
 
Patient Name: BRENNON KELLY
Encounter #: [id number]
Page [id number]
 
 
 
 
 
Electronically Signed by HERNAN HANNAH on 19 at 1219
 
 
 
 
 
 
**All edits/amendments must be made on the electronic document**
 
DICTATION DATE: 19 1219     : MIKA  19 1219     
RPT#: [id number]                                DC DATE:        
                                               STATUS: ADM IN  
[facility]
 [place] [address]
***END OF REPORT***

## 2019-01-21 NOTE — MORECARE
CASE MANAGEMENT DISCHARGE SUMMARY
 
 
PATIENT: BRENNON KELLY PILAR                    UNIT: [id number]
ACCOUNT#: [id number]                       ADM DATE: 19
AGE: 58     : 58  SEX: M            ROOM/BED: D.[facility]    
AUTHOR: BRIELLE,DOC                             PHYSICIAN:                               
 
REFERRING PHYSICIAN: HIMANSHU PRADO MD            
DATE OF SERVICE: 19
Discharge Plan
 
 
Patient Name: BRENNON KELLY
Facility: [facility]:[place]
Encounter #: [id number]
Medical Record #: [id number]
: 1960
Planned Disposition: Home
Anticipated Discharge Date: 
 
Discharge Date: 
Expected LOS: 
Initial Reviewer: [email or web address]
Initial Review Date: 2019
Generated: 19   1:26 pm 
Comments
 
DCP- Discharge Planning
 
Updated by [id number]: Marcella Smith on 19  11:21 am CT
Patient Name: BRENNON KELLY                                     
Admission Status: Elective   
Accout number: [id number]                              
Admission Date: 2019   
: 1960                                                        
Admission Diagnosis:   
Attending: HIMANSHU PRADO                                                
Current LOS:  4   
  
Anticipated DC Date:    
Planned Disposition: Home   
Primary Insurance: BLUE CROSS OUT OF STATE   
  
  
Discharge Planning Comments: CM met with patient and spouse at bedside after 
obtaining verbal consent.  Patient states he plans on returning home after 
discharge with his wife.  Patient states he will have family transport him 
home via private vehicle.  Patient denies any discharge needs at this time. 
CM will continue to follow and assist as needed for discharge planning / 
 
needs.  
  
  
  
  
  
  
: Marcella Smith
 DCPIA - Discharge Planning Initial Assessment
 
Updated by [email or web address]: Marcella Smith on 19  12:18 pm
*  Is the patient Alert and Oriented?
Yes
*  How many steps to enter\exit or inside your home?
 
*  PCP
[facility]
*  Pharmacy
[facility]
*  Preadmission Environment
Home with Family
*  ADLs
Independent
*  Other Equipment
WALKER, CANE, WHEELCHAIR AVAILABLE IF NEEDED
*  List name and contact numbers for known caregivers / representatives who 
currently or will assist patient after discharge:
ROSE KELLY - [facility] - [phone]
*  Verbal permission to speak to the caregivers and representatives has been 
obtained from the patient.
Yes
*  Community resources currently utilized
None
*  Additional services required to return to the preadmission environment?
No
*  Can the patient safely return to the preadmission environment?
Yes
*  Has this patient been hospitalized within the prior 30 days at any 
hospital?
No
 
 
 
 
 
 
 
Last DP export: 19  11:19 a
Patient Name: BRENNON KELLY
 
Encounter #: [id number]
Page [phone]
 
 
 
 
 
Electronically Signed by HERNAN HANNAH on 19 at 1227
 
 
 
 
 
 
**All edits/amendments must be made on the electronic document**
 
DICTATION DATE: 19     : MIKA  196     
RPT#: [id number]                                DC DATE:        
                                               STATUS: ADM IN  
[facility]
 [place] [address]
***END OF REPORT***

## 2019-01-22 VITALS — SYSTOLIC BLOOD PRESSURE: 110 MMHG | DIASTOLIC BLOOD PRESSURE: 64 MMHG

## 2019-01-22 VITALS — SYSTOLIC BLOOD PRESSURE: 102 MMHG | DIASTOLIC BLOOD PRESSURE: 64 MMHG

## 2019-01-22 VITALS — DIASTOLIC BLOOD PRESSURE: 62 MMHG | SYSTOLIC BLOOD PRESSURE: 110 MMHG

## 2019-01-22 VITALS — SYSTOLIC BLOOD PRESSURE: 126 MMHG | DIASTOLIC BLOOD PRESSURE: 75 MMHG

## 2019-01-22 VITALS — SYSTOLIC BLOOD PRESSURE: 133 MMHG | DIASTOLIC BLOOD PRESSURE: 72 MMHG

## 2019-01-22 VITALS — DIASTOLIC BLOOD PRESSURE: 61 MMHG | SYSTOLIC BLOOD PRESSURE: 112 MMHG

## 2019-01-22 VITALS — DIASTOLIC BLOOD PRESSURE: 55 MMHG | SYSTOLIC BLOOD PRESSURE: 106 MMHG

## 2019-01-22 VITALS — SYSTOLIC BLOOD PRESSURE: 104 MMHG | DIASTOLIC BLOOD PRESSURE: 62 MMHG

## 2019-01-22 VITALS — SYSTOLIC BLOOD PRESSURE: 108 MMHG | DIASTOLIC BLOOD PRESSURE: 64 MMHG

## 2019-01-22 VITALS — DIASTOLIC BLOOD PRESSURE: 75 MMHG | SYSTOLIC BLOOD PRESSURE: 133 MMHG

## 2019-01-22 VITALS — DIASTOLIC BLOOD PRESSURE: 64 MMHG | SYSTOLIC BLOOD PRESSURE: 108 MMHG

## 2019-01-22 VITALS — SYSTOLIC BLOOD PRESSURE: 120 MMHG | DIASTOLIC BLOOD PRESSURE: 72 MMHG

## 2019-01-22 VITALS — DIASTOLIC BLOOD PRESSURE: 62 MMHG | SYSTOLIC BLOOD PRESSURE: 106 MMHG

## 2019-01-22 VITALS — DIASTOLIC BLOOD PRESSURE: 71 MMHG | SYSTOLIC BLOOD PRESSURE: 96 MMHG

## 2019-01-22 VITALS — SYSTOLIC BLOOD PRESSURE: 121 MMHG | DIASTOLIC BLOOD PRESSURE: 68 MMHG

## 2019-01-22 VITALS — SYSTOLIC BLOOD PRESSURE: 117 MMHG | DIASTOLIC BLOOD PRESSURE: 69 MMHG

## 2019-01-22 VITALS — DIASTOLIC BLOOD PRESSURE: 67 MMHG | SYSTOLIC BLOOD PRESSURE: 118 MMHG

## 2019-01-22 VITALS — SYSTOLIC BLOOD PRESSURE: 104 MMHG | DIASTOLIC BLOOD PRESSURE: 61 MMHG

## 2019-01-22 VITALS — SYSTOLIC BLOOD PRESSURE: 109 MMHG | DIASTOLIC BLOOD PRESSURE: 55 MMHG

## 2019-01-22 VITALS — DIASTOLIC BLOOD PRESSURE: 69 MMHG | SYSTOLIC BLOOD PRESSURE: 111 MMHG

## 2019-01-22 VITALS — DIASTOLIC BLOOD PRESSURE: 67 MMHG | SYSTOLIC BLOOD PRESSURE: 115 MMHG

## 2019-01-22 VITALS — SYSTOLIC BLOOD PRESSURE: 116 MMHG | DIASTOLIC BLOOD PRESSURE: 67 MMHG

## 2019-01-22 VITALS — DIASTOLIC BLOOD PRESSURE: 68 MMHG | SYSTOLIC BLOOD PRESSURE: 111 MMHG

## 2019-01-22 VITALS — DIASTOLIC BLOOD PRESSURE: 61 MMHG | SYSTOLIC BLOOD PRESSURE: 109 MMHG

## 2019-01-22 LAB
ALBUMIN SERPL-MCNC: 2.3 G/DL (ref 3.4–5)
ALP SERPL-CCNC: 37 U/L (ref 46–116)
ALT SERPL-CCNC: 81 U/L (ref 10–68)
ANION GAP SERPL CALC-SCNC: 14.6 MMOL/L (ref 8–16)
BILIRUB SERPL-MCNC: 0.55 MG/DL (ref 0.2–1.3)
BUN SERPL-MCNC: 21 MG/DL (ref 7–18)
CALCIUM SERPL-MCNC: 7.7 MG/DL (ref 8.5–10.1)
CHLORIDE SERPL-SCNC: 102 MMOL/L (ref 98–107)
CO2 SERPL-SCNC: 23.4 MMOL/L (ref 21–32)
CREAT SERPL-MCNC: 1 MG/DL (ref 0.6–1.3)
ERYTHROCYTE [DISTWIDTH] IN BLOOD BY AUTOMATED COUNT: 14 % (ref 11.5–14.5)
GLOBULIN SER-MCNC: 3.8 G/L
GLUCOSE SERPL-MCNC: 98 MG/DL (ref 74–106)
HCT VFR BLD CALC: 25.1 % (ref 42–54)
HGB BLD-MCNC: 8.2 G/DL (ref 13.5–17.5)
MCH RBC QN AUTO: 29.6 PG (ref 26–34)
MCHC RBC AUTO-ENTMCNC: 32.7 G/DL (ref 31–37)
MCV RBC: 90.6 FL (ref 80–100)
OSMOLALITY SERPL CALC.SUM OF ELEC: 274 MOSM/KG (ref 275–300)
PLATELET # BLD: 242 10X3/UL (ref 130–400)
PMV BLD AUTO: 10.5 FL (ref 7.4–10.4)
POTASSIUM SERPL-SCNC: 4 MMOL/L (ref 3.5–5.1)
PROT SERPL-MCNC: 6.1 G/DL (ref 6.4–8.2)
RBC # BLD AUTO: 2.77 10X6/UL (ref 4.2–6.1)
SODIUM SERPL-SCNC: 136 MMOL/L (ref 136–145)
WBC # BLD AUTO: 15.2 10X3/UL (ref 4.8–10.8)

## 2019-01-23 VITALS — DIASTOLIC BLOOD PRESSURE: 62 MMHG | SYSTOLIC BLOOD PRESSURE: 109 MMHG

## 2019-01-23 VITALS — DIASTOLIC BLOOD PRESSURE: 59 MMHG | SYSTOLIC BLOOD PRESSURE: 114 MMHG

## 2019-01-23 VITALS — SYSTOLIC BLOOD PRESSURE: 112 MMHG | DIASTOLIC BLOOD PRESSURE: 65 MMHG

## 2019-01-23 VITALS — SYSTOLIC BLOOD PRESSURE: 113 MMHG | DIASTOLIC BLOOD PRESSURE: 68 MMHG

## 2019-01-23 VITALS — DIASTOLIC BLOOD PRESSURE: 60 MMHG | SYSTOLIC BLOOD PRESSURE: 104 MMHG

## 2019-01-23 VITALS — DIASTOLIC BLOOD PRESSURE: 60 MMHG | SYSTOLIC BLOOD PRESSURE: 112 MMHG

## 2019-01-23 VITALS — DIASTOLIC BLOOD PRESSURE: 68 MMHG | SYSTOLIC BLOOD PRESSURE: 116 MMHG

## 2019-01-23 VITALS — DIASTOLIC BLOOD PRESSURE: 78 MMHG | SYSTOLIC BLOOD PRESSURE: 118 MMHG

## 2019-01-23 VITALS — SYSTOLIC BLOOD PRESSURE: 105 MMHG | DIASTOLIC BLOOD PRESSURE: 63 MMHG

## 2019-01-23 VITALS — SYSTOLIC BLOOD PRESSURE: 111 MMHG | DIASTOLIC BLOOD PRESSURE: 68 MMHG

## 2019-01-23 VITALS — DIASTOLIC BLOOD PRESSURE: 57 MMHG | SYSTOLIC BLOOD PRESSURE: 103 MMHG

## 2019-01-23 VITALS — DIASTOLIC BLOOD PRESSURE: 59 MMHG | SYSTOLIC BLOOD PRESSURE: 120 MMHG

## 2019-01-23 VITALS — DIASTOLIC BLOOD PRESSURE: 63 MMHG | SYSTOLIC BLOOD PRESSURE: 120 MMHG

## 2019-01-23 VITALS — DIASTOLIC BLOOD PRESSURE: 70 MMHG | SYSTOLIC BLOOD PRESSURE: 126 MMHG

## 2019-01-23 VITALS — DIASTOLIC BLOOD PRESSURE: 67 MMHG | SYSTOLIC BLOOD PRESSURE: 100 MMHG

## 2019-01-23 VITALS — DIASTOLIC BLOOD PRESSURE: 71 MMHG | SYSTOLIC BLOOD PRESSURE: 121 MMHG

## 2019-01-23 VITALS — DIASTOLIC BLOOD PRESSURE: 68 MMHG | SYSTOLIC BLOOD PRESSURE: 113 MMHG

## 2019-01-24 NOTE — MORECARE
CASE MANAGEMENT DISCHARGE SUMMARY
 
 
PATIENT: BRENNNO KELLY PILAR                    UNIT: [id number]
ACCOUNT#: [id number]                       ADM DATE: 19
AGE: 58     : 58  SEX: M            ROOM/BED: D.[facility]    
AUTHOR: BRIELLE,DOC                             PHYSICIAN:                               
 
REFERRING PHYSICIAN: HIMANSHU PRADO MD            
DATE OF SERVICE: 19
Discharge Plan
 
 
Patient Name: BRENNON KELLY
Facility: [facility]:[place]
Encounter #: [id number]
Medical Record #: [id number]
: 1960
Planned Disposition: Home
Anticipated Discharge Date: 
 
Discharge Date: 2019
Expected LOS: 
Initial Reviewer: [id number]
Initial Review Date: 2019
Generated: 19  11:27 am 
Comments
 
DCP- Discharge Planning
 
Updated by [id number]: Marcella Smith on 19  11:21 am CT
Patient Name: BRENNON KELLY                                     
Admission Status: Elective   
Accout number: [id number]                              
Admission Date: 2019   
: 1960                                                        
Admission Diagnosis:   
Attending: HIMANSHU PRADO                                                
Current LOS:  4   
  
Anticipated DC Date:    
Planned Disposition: Home   
Primary Insurance: BLUE CROSS OUT OF STATE   
  
  
Discharge Planning Comments: CM met with patient and spouse at bedside after 
obtaining verbal consent.  Patient states he plans on returning home after 
discharge with his wife.  Patient states he will have family transport him 
home via private vehicle.  Patient denies any discharge needs at this time. 
CM will continue to follow and assist as needed for discharge planning / 
 
needs.  
  
  
  
  
  
  
: Marcella Smith
 DCPIA - Discharge Planning Initial Assessment
 
Updated by [email or web address]: Marcella Smith on 19  12:18 pm
*  Is the patient Alert and Oriented?
Yes
*  How many steps to enter\exit or inside your home?
 
*  PCP
[facility]
*  Pharmacy
[facility]
*  Preadmission Environment
Home with Family
*  ADLs
Independent
*  Other Equipment
WALKER, CANE, WHEELCHAIR AVAILABLE IF NEEDED
*  List name and contact numbers for known caregivers / representatives who 
currently or will assist patient after discharge:
ROSE KELLY - [facility] - [phone]
*  Verbal permission to speak to the caregivers and representatives has been 
obtained from the patient.
Yes
*  Community resources currently utilized
None
*  Additional services required to return to the preadmission environment?
No
*  Can the patient safely return to the preadmission environment?
Yes
*  Has this patient been hospitalized within the prior 30 days at any 
hospital?
No
 
 
 
 
 
 
 
Last DP export: 19  11:27 a
Patient Name: BRENNON KELLY
 
Encounter #: [id number]
Page [phone]
 
 
 
 
 
Electronically Signed by HERNAN HANNAH on 19 at 1027
 
 
 
 
 
 
**All edits/amendments must be made on the electronic document**
 
DICTATION DATE: 19 1026     : MIKA  19 1026     
RPT#: [id number]                                DC DATE:19
                                               STATUS: DIS IN  
[facility]
[address] [place] [address]
***END OF REPORT***

## 2019-01-28 ENCOUNTER — HOSPITAL ENCOUNTER (OUTPATIENT)
Dept: HOSPITAL 84 - D.RAD | Age: 59
Discharge: HOME | End: 2019-01-28
Attending: THORACIC SURGERY (CARDIOTHORACIC VASCULAR SURGERY)
Payer: COMMERCIAL

## 2019-01-28 VITALS — BODY MASS INDEX: 35.2 KG/M2

## 2019-01-28 DIAGNOSIS — R79.89: Primary | ICD-10-CM

## 2019-01-28 DIAGNOSIS — J90: ICD-10-CM

## 2019-02-02 NOTE — EC
PATIENT:BRENNON KELLY                DATE OF SERVICE: 01/28/19
SEX: M                                  MEDICAL RECORD: [id number]
DATE OF BIRTH: [birth year]                        LOCATION:D.[facility]          
AGE OF PATIENT: 58                             ADMISSION DATE: 01/28/19
 
REFERRING PHYSICIAN:                               
 
INTERPRETING PHYSICIAN: VIOLA CRAIG MD          
 
 
 
                             ECHOCARDIOGRAM REPORT
  ECHO CHARGES 4               ECHO COMPLETE                 Date: 01/28/19
 
 
 
CLINICAL DIAGNOSIS: EVALUATE LV FUNCTION/EFFUSION 
 
                         ECHOCARDIOGRAPHIC MEASUREMENTS
      (adult normal given)
   AC root (d.<3.7cm) 3.1  cm   LV Septum d (<1.2 cm> 1.2  cm
      Valve Excursion 1.2  cm     LV Septum (systole) 1.9  cm
Left Atria (s.<4.0cm> 3.9  cm          LVPW d(<1.2cm) 1.4  cm
        RV (d.<2.3cm) 1.7  cm           LVPW (sytole) 2.0  cm
  LV diastole(<5.6CM) 1.8  cm       MV E-F(>70mm/sec)      cm
           LV systole 2.8  cm           LVOT Diameter 1.8  cm
       MV exc.(>10mm)      cm
Est.ejection fraction (50-75%)     %
 
   DOPPLER:
     LVIT      cm/sec A 67.0 cm/sec E 119   cm/sec
       LA      cm/sec      RVSP      mmHg
     LVOT 113  cm/sec   AOP1/2T      m/s
  Asc. Ao 265  cm/sec
     RVOT 67.0 cm/sec
       RA      cm/sec
         cm/sec
 AV Gradient Peak 28.0 mmHg  AV Mean 16.3 mmHg  AV Area 1.0  cm
 MV Gradient Peak 7.4  mmHg  MV Mean 2.3  mmHg  MV Area      cm
   COMMENTS:                                              
 
 
 Cardiac Sonographer: 1               JOS JUANOE            
      Cardiologist: 3          Dr. De Dios             
             TAPE# PACS           
                                       Pericardial Effusion Y                        
 
 
DATE OF SERVICE:  01/28/2019
 
Adequate 2-D, color flow and spectral Doppler, and M-mode.
 
LVH is present.  LV internal dimension is normal.  Wall motion is normal.  EF is
greater than or equal to 55%.  Prosthetic tissue aortic valve is noted with
acceptable Doppler velocity.  Left atrium is normal at 3.9 cm.  Mitral valve
shows no prolapse and trace MR only.  Right-sided chambers are grossly normal. 
Trace TR only.
 
 
 
 
ECHOCARDIOGRAM REPORT                          [id number]    BRENNON KELLY        
 
 
TRANSINT:[id number] Voice Confirmation ID: [id number] DOCUMENT ID: [id number]
                                           
                                           VIOLA CRAIG MD          
 
 
 
Electronically Signed by VIOLA CRAIG on 02/02/19 at 1255
 
 
 
 
 
 
 
 
 
 
 
 
 
 
 
 
 
 
 
 
 
 
 
 
 
 
 
 
 
 
 
 
 
 
 
 
 
 
 
CC:                                                             [id number]
DICTATION DATE: 01/30/19 1409     :     01/30/19 1848      DEP CLI 
                                                                      01/28/19
[id number] [place] [id number]

## 2019-02-13 ENCOUNTER — HOSPITAL ENCOUNTER (OUTPATIENT)
Dept: HOSPITAL 84 - D.LAB | Age: 59
Discharge: HOME | End: 2019-02-13
Attending: THORACIC SURGERY (CARDIOTHORACIC VASCULAR SURGERY)
Payer: COMMERCIAL

## 2019-02-13 VITALS — BODY MASS INDEX: 35.2 KG/M2

## 2019-02-13 DIAGNOSIS — J91.8: Primary | ICD-10-CM

## 2019-02-13 DIAGNOSIS — D64.9: ICD-10-CM

## 2019-02-13 LAB
ALBUMIN SERPL-MCNC: 3.4 G/DL (ref 3.4–5)
ALP SERPL-CCNC: 139 U/L (ref 46–116)
ALT SERPL-CCNC: 75 U/L (ref 10–68)
ANION GAP SERPL CALC-SCNC: 16.8 MMOL/L (ref 8–16)
BILIRUB SERPL-MCNC: 0.45 MG/DL (ref 0.2–1.3)
BUN SERPL-MCNC: 19 MG/DL (ref 7–18)
CALCIUM SERPL-MCNC: 8.7 MG/DL (ref 8.5–10.1)
CHLORIDE SERPL-SCNC: 103 MMOL/L (ref 98–107)
CO2 SERPL-SCNC: 24.2 MMOL/L (ref 21–32)
CREAT SERPL-MCNC: 1.1 MG/DL (ref 0.6–1.3)
ERYTHROCYTE [DISTWIDTH] IN BLOOD BY AUTOMATED COUNT: 13.9 % (ref 11.5–14.5)
GLOBULIN SER-MCNC: 3.9 G/L
GLUCOSE SERPL-MCNC: 105 MG/DL (ref 74–106)
HCT VFR BLD CALC: 41.4 % (ref 42–54)
HGB BLD-MCNC: 13.4 G/DL (ref 13.5–17.5)
MCH RBC QN AUTO: 29.7 PG (ref 26–34)
MCHC RBC AUTO-ENTMCNC: 32.4 G/DL (ref 31–37)
MCV RBC: 91.8 FL (ref 80–100)
OSMOLALITY SERPL CALC.SUM OF ELEC: 280 MOSM/KG (ref 275–300)
PLATELET # BLD: 307 10X3/UL (ref 130–400)
PMV BLD AUTO: 9.6 FL (ref 7.4–10.4)
POTASSIUM SERPL-SCNC: 4 MMOL/L (ref 3.5–5.1)
PROT SERPL-MCNC: 7.3 G/DL (ref 6.4–8.2)
RBC # BLD AUTO: 4.51 10X6/UL (ref 4.2–6.1)
SODIUM SERPL-SCNC: 140 MMOL/L (ref 136–145)
WBC # BLD AUTO: 8.6 10X3/UL (ref 4.8–10.8)

## 2019-03-19 ENCOUNTER — HOSPITAL ENCOUNTER (OUTPATIENT)
Dept: HOSPITAL 84 - D.LAB | Age: 59
Discharge: HOME | End: 2019-03-19
Attending: THORACIC SURGERY (CARDIOTHORACIC VASCULAR SURGERY)
Payer: COMMERCIAL

## 2019-03-19 VITALS — BODY MASS INDEX: 35.2 KG/M2

## 2019-03-19 DIAGNOSIS — Z79.899: ICD-10-CM

## 2019-03-19 DIAGNOSIS — Z51.81: Primary | ICD-10-CM

## 2019-03-19 LAB
ALBUMIN SERPL-MCNC: 3.3 G/DL (ref 3.4–5)
ALP SERPL-CCNC: 78 U/L (ref 46–116)
ALT SERPL-CCNC: 33 U/L (ref 10–68)
BILIRUB DIRECT SERPL-MCNC: 0.07 MG/DL (ref 0–0.3)
BILIRUB INDIRECT SERPL-MCNC: 0.19 MG/DL (ref 0–1)
BILIRUB SERPL-MCNC: 0.26 MG/DL (ref 0.2–1.3)
GLOBULIN SER-MCNC: 4.2 G/L
PROT SERPL-MCNC: 7.5 G/DL (ref 6.4–8.2)

## 2020-05-15 ENCOUNTER — HOSPITAL ENCOUNTER (OUTPATIENT)
Dept: HOSPITAL 84 - D.HCCECHO | Age: 60
Discharge: HOME | End: 2020-05-15
Attending: INTERNAL MEDICINE
Payer: COMMERCIAL

## 2020-05-15 VITALS — BODY MASS INDEX: 35.2 KG/M2

## 2020-05-15 DIAGNOSIS — Z95.4: Primary | ICD-10-CM

## 2020-05-17 NOTE — EC
PATIENT:BRENNON KELLY                DATE OF SERVICE: 05/15/20
SEX: M                                  MEDICAL RECORD: [id number]
DATE OF BIRTH: [birth year]                        [facility]          
AGE OF PATIENT: 60                             ADMISSION DATE: 05/15/20
 
REFERRING PHYSICIAN:                               
 
INTERPRETING PHYSICIAN: VIOLA CRAIG MD          
 
 
 
                             ECHOCARDIOGRAM REPORT
  ECHO CHARGES 4               ECHO COMPLETE                 Date: 05/15/20
 
 
 
CLINICAL DIAGNOSIS: AVR/CABG                      
 
                         ECHOCARDIOGRAPHIC MEASUREMENTS
      (adult normal given)
   AC root (d.<3.7cm) 2.1  cm   LV Septum d (<1.2 cm> 1.3  cm
      Valve Excursion 1.0  cm     LV Septum (systole) 1.4  cm
Left Atria (s.<4.0cm> 4.1  cm          LVPW d(<1.2cm) 1.4  cm
        RV (d.<2.3cm) 3.6  cm           LVPW (sytole) 1.5  cm
  LV diastole(<5.6CM) 5.2  cm       MV E-F(>70mm/sec)      cm
           LV systole 3.5  cm           LVOT Diameter 1.5  cm
       MV exc.(>10mm) 1.2  cm
Est.ejection fraction (50-75%)     %
 
   DOPPLER:
     LVIT      cm/sec A 87.0 cm/sec E 82.0  cm/sec
       LA      cm/sec      RVSP 21   mmHg
     LVOT 119  cm/sec   AOP1/2T      m/s
  Asc. Ao 217  cm/sec
     RVOT 87   cm/sec
       RA      cm/sec
         cm/sec
 AV Gradient Peak 18.84mmHg  AV Mean 11.66mmHg  AV Area 1.5  cm
 MV Gradient Peak 3.83 mmHg  MV Mean 1.99 mmHg  MV Area      cm
   COMMENTS:                                              
 
 
 Cardiac Sonographer: 2               ROSANNA RESTREPO              
      Cardiologist: 3          Dr. De Dios             
             TAPE# PACS           
                                       Pericardial Effusion N                        
 
 
DATE OF SERVICE:  
 
Adequate 2D, color flow imaging, spectral Doppler, and M-Mode
 
LVH is present.  LV internal dimensions are normal.  Wall motion is normal.  EF
is greater than or equal to 55%.  Prosthetic tissue aortic valve is noted with
acceptable Doppler velocity and no significant AI.  Left atrium is upper limits
of normal, mildly dilated at 4.1 cm.  Mitral valve shows no prolapse.  Trace MR.
 Right-sided chambers are grossly normal.  Trace TR.
 
 
 
 
ECHOCARDIOGRAM REPORT                          [id number]    BRENNON KELLY        
 
 
TRANSINT:[id number] Voice Confirmation ID: [id number] DOCUMENT ID: [id number]
                                           
                                           VIOLA CRAIG MD          
 
 
 
Electronically Signed by VIOLA CRAIG on 05/17/20 at 0856
 
 
 
 
 
 
 
 
 
 
 
 
 
 
 
 
 
 
 
 
 
 
 
 
 
 
 
 
 
 
 
 
 
 
 
 
 
 
 
CC:                                                             [id number]
DICTATION DATE: 05/16/20 1009     :     05/16/20 1516      [facility] CLI 
                                                                      05/15/20
[id number] [place] [id number]

## 2021-06-04 ENCOUNTER — HOSPITAL ENCOUNTER (OUTPATIENT)
Dept: HOSPITAL 84 - D.HCCECHO | Age: 61
Discharge: HOME | End: 2021-06-04
Attending: INTERNAL MEDICINE
Payer: COMMERCIAL

## 2021-06-04 VITALS — BODY MASS INDEX: 35.2 KG/M2

## 2021-06-04 DIAGNOSIS — I42.9: Primary | ICD-10-CM

## 2021-06-08 NOTE — EC
PATIENT:BRENNON KELLY                DATE OF SERVICE: 06/04/21
SEX: M                                  MEDICAL RECORD: [id number]
DATE OF BIRTH: [birth year]                        [facility]          
AGE OF PATIENT: 61                             ADMISSION DATE: 06/04/21
 
REFERRING PHYSICIAN:                               
 
INTERPRETING PHYSICIAN: VIOLA CRAIG MD          
 
 
 
                             ECHOCARDIOGRAM REPORT
  ECHO CHARGES 4               ECHO COMPLETE                 Date: 06/04/21
 
 
 
CLINICAL DIAGNOSIS: CAD/HTN/AORTIC VALVE          
                    REPLACEMENT                   
                    ASSESS EF AND AORITC VALVE    
                         ECHOCARDIOGRAPHIC MEASUREMENTS
      (adult normal given)
   AC root (d.<3.7cm) 2.8  cm   LV Septum d (<1.2 cm> 1.4  cm
      Valve Excursion 1.6  cm     LV Septum (systole) 1.6  cm
Left Atria (s.<4.0cm> 4.0  cm          LVPW d(<1.2cm) 1.5  cm
        RV (d.<2.3cm) 3.4  cm           LVPW (sytole) 1.6  cm
  LV diastole(<5.6CM) 5.0  cm       MV E-F(>70mm/sec)      cm
           LV systole 3.1  cm           LVOT Diameter 1.6  cm
       MV exc.(>10mm) 1.3  cm
Est.ejection fraction (50-75%)     %
 
   DOPPLER:
     LVIT      cm/sec A 82.0 cm/sec E 95.0  cm/sec
       LA      cm/sec      RVSP 15   mmHg
     LVOT 86   cm/sec   AOP1/2T      m/s
  Asc. Ao 172  cm/sec
     RVOT 76   cm/sec
       RA      cm/sec
         cm/sec
 AV Gradient Peak 11.88mmHg  AV Mean 7.52 mmHg  AV Area 1.5  cm
 MV Gradient Peak 4.27 mmHg  MV Mean 2.08 mmHg  MV Area      cm
   COMMENTS:                                              
 
 
 Cardiac Sonographer: 2               ROSANNA RESTREPO              
      Cardiologist: 3          Dr. De Dios             
             TAPE# PACS           
                                       Pericardial Effusion N                        
 
 
DATE OF SERVICE:  
 
Adequate 2D, color flow imaging, spectral Doppler, and M-Mode.
 
FINDINGS:  LVH is present.  LV internal dimensions are normal.  Wall motion is
normal.  EF is greater than or equal to 55%.  Tissue prosthetic aortic valve is
noted with acceptable Doppler velocity.  No significant AI.  Left atrium appears
normal at 4.0 cm.  Mitral valve shows no prolapse.  Trivial MR.  Right side is
grossly normal.  Trivial TR.
 
 
 
ECHOCARDIOGRAM REPORT                          [id number]    BRENNON KELLY        
 
 
 
TRANSINT:[id number] Voice Confirmation ID: [id number] DOCUMENT ID: [id number]
                                           
                                           VIOLA CRAIG MD          
 
 
 
Electronically Signed by VIOLA CRAIG on 06/08/21 at 0813
 
 
 
 
 
 
 
 
 
 
 
 
 
 
 
 
 
 
 
 
 
 
 
 
 
 
 
 
 
 
 
 
 
 
 
 
 
 
CC:                                                             [id number]
DICTATION DATE: 06/07/21 1055     :     06/07/21 1934      DEP CLI 
                                                                      06/04/21
[facility]                                          
[address] [id number]